# Patient Record
Sex: MALE | Race: WHITE | HISPANIC OR LATINO | Employment: OTHER | URBAN - METROPOLITAN AREA
[De-identification: names, ages, dates, MRNs, and addresses within clinical notes are randomized per-mention and may not be internally consistent; named-entity substitution may affect disease eponyms.]

---

## 2023-07-13 PROBLEM — N19 UREMIA: Status: ACTIVE | Noted: 2023-07-13

## 2023-07-14 PROBLEM — D64.9 ANEMIA: Status: ACTIVE | Noted: 2023-07-14

## 2023-07-14 PROBLEM — Z91.158 NONCOMPLIANCE OF PATIENT WITH RENAL DIALYSIS: Status: ACTIVE | Noted: 2023-07-14

## 2024-07-16 ENCOUNTER — APPOINTMENT (OUTPATIENT)
Dept: CT IMAGING | Facility: HOSPITAL | Age: 69
End: 2024-07-16
Payer: MEDICARE

## 2024-07-16 ENCOUNTER — HOSPITAL ENCOUNTER (OUTPATIENT)
Facility: HOSPITAL | Age: 69
Setting detail: OBSERVATION
Discharge: HOME | End: 2024-07-18
Attending: EMERGENCY MEDICINE | Admitting: INTERNAL MEDICINE
Payer: MEDICARE

## 2024-07-16 DIAGNOSIS — R51.9 NONINTRACTABLE HEADACHE, UNSPECIFIED CHRONICITY PATTERN, UNSPECIFIED HEADACHE TYPE: ICD-10-CM

## 2024-07-16 DIAGNOSIS — E87.5 HYPERKALEMIA: Primary | ICD-10-CM

## 2024-07-16 LAB
ANION GAP SERPL CALCULATED.3IONS-SCNC: 13 MMOL/L (ref 5–15)
BASOPHILS # BLD AUTO: 0.04 10*3/MM3 (ref 0–0.2)
BASOPHILS NFR BLD AUTO: 0.5 % (ref 0–1.5)
BUN SERPL-MCNC: 47 MG/DL (ref 8–23)
BUN/CREAT SERPL: 6.5 (ref 7–25)
CALCIUM SPEC-SCNC: 8.6 MG/DL (ref 8.6–10.5)
CHLORIDE SERPL-SCNC: 95 MMOL/L (ref 98–107)
CO2 SERPL-SCNC: 29 MMOL/L (ref 22–29)
CREAT SERPL-MCNC: 7.18 MG/DL (ref 0.76–1.27)
DEPRECATED RDW RBC AUTO: 41.8 FL (ref 37–54)
EGFRCR SERPLBLD CKD-EPI 2021: 7.8 ML/MIN/1.73
EOSINOPHIL # BLD AUTO: 0.28 10*3/MM3 (ref 0–0.4)
EOSINOPHIL NFR BLD AUTO: 3.4 % (ref 0.3–6.2)
ERYTHROCYTE [DISTWIDTH] IN BLOOD BY AUTOMATED COUNT: 12.1 % (ref 12.3–15.4)
GLUCOSE SERPL-MCNC: 106 MG/DL (ref 65–99)
HCT VFR BLD AUTO: 36.1 % (ref 37.5–51)
HGB BLD-MCNC: 12 G/DL (ref 13–17.7)
IMM GRANULOCYTES # BLD AUTO: 0.01 10*3/MM3 (ref 0–0.05)
IMM GRANULOCYTES NFR BLD AUTO: 0.1 % (ref 0–0.5)
LYMPHOCYTES # BLD AUTO: 1.42 10*3/MM3 (ref 0.7–3.1)
LYMPHOCYTES NFR BLD AUTO: 17.3 % (ref 19.6–45.3)
MAGNESIUM SERPL-MCNC: 2.7 MG/DL (ref 1.6–2.4)
MCH RBC QN AUTO: 31.5 PG (ref 26.6–33)
MCHC RBC AUTO-ENTMCNC: 33.2 G/DL (ref 31.5–35.7)
MCV RBC AUTO: 94.8 FL (ref 79–97)
MONOCYTES # BLD AUTO: 0.85 10*3/MM3 (ref 0.1–0.9)
MONOCYTES NFR BLD AUTO: 10.3 % (ref 5–12)
NEUTROPHILS NFR BLD AUTO: 5.62 10*3/MM3 (ref 1.7–7)
NEUTROPHILS NFR BLD AUTO: 68.4 % (ref 42.7–76)
NRBC BLD AUTO-RTO: 0 /100 WBC (ref 0–0.2)
PHOSPHATE SERPL-MCNC: 4.6 MG/DL (ref 2.5–4.5)
PLATELET # BLD AUTO: 208 10*3/MM3 (ref 140–450)
PMV BLD AUTO: 9.2 FL (ref 6–12)
POTASSIUM SERPL-SCNC: 5.9 MMOL/L (ref 3.5–5.2)
QT INTERVAL: 474 MS
QTC INTERVAL: 482 MS
RBC # BLD AUTO: 3.81 10*6/MM3 (ref 4.14–5.8)
SODIUM SERPL-SCNC: 137 MMOL/L (ref 136–145)
WBC NRBC COR # BLD AUTO: 8.22 10*3/MM3 (ref 3.4–10.8)

## 2024-07-16 PROCEDURE — 25010000002 DIPHENHYDRAMINE PER 50 MG: Performed by: EMERGENCY MEDICINE

## 2024-07-16 PROCEDURE — G0378 HOSPITAL OBSERVATION PER HR: HCPCS

## 2024-07-16 PROCEDURE — 25010000002 PROCHLORPERAZINE 10 MG/2ML SOLUTION: Performed by: EMERGENCY MEDICINE

## 2024-07-16 PROCEDURE — 93010 ELECTROCARDIOGRAM REPORT: CPT | Performed by: INTERNAL MEDICINE

## 2024-07-16 PROCEDURE — 85025 COMPLETE CBC W/AUTO DIFF WBC: CPT | Performed by: EMERGENCY MEDICINE

## 2024-07-16 PROCEDURE — 83735 ASSAY OF MAGNESIUM: CPT | Performed by: INTERNAL MEDICINE

## 2024-07-16 PROCEDURE — 99291 CRITICAL CARE FIRST HOUR: CPT

## 2024-07-16 PROCEDURE — 70450 CT HEAD/BRAIN W/O DYE: CPT

## 2024-07-16 PROCEDURE — 25010000002 HEPARIN (PORCINE) PER 1000 UNITS: Performed by: INTERNAL MEDICINE

## 2024-07-16 PROCEDURE — 96372 THER/PROPH/DIAG INJ SC/IM: CPT

## 2024-07-16 PROCEDURE — 63710000001 INSULIN REGULAR HUMAN PER 5 UNITS: Performed by: EMERGENCY MEDICINE

## 2024-07-16 PROCEDURE — 96374 THER/PROPH/DIAG INJ IV PUSH: CPT

## 2024-07-16 PROCEDURE — 93005 ELECTROCARDIOGRAM TRACING: CPT | Performed by: EMERGENCY MEDICINE

## 2024-07-16 PROCEDURE — 80048 BASIC METABOLIC PNL TOTAL CA: CPT | Performed by: EMERGENCY MEDICINE

## 2024-07-16 PROCEDURE — 99213 OFFICE O/P EST LOW 20 MIN: CPT

## 2024-07-16 PROCEDURE — G0257 UNSCHED DIALYSIS ESRD PT HOS: HCPCS

## 2024-07-16 PROCEDURE — 84100 ASSAY OF PHOSPHORUS: CPT | Performed by: INTERNAL MEDICINE

## 2024-07-16 PROCEDURE — 96375 TX/PRO/DX INJ NEW DRUG ADDON: CPT

## 2024-07-16 RX ORDER — DIPHENHYDRAMINE HYDROCHLORIDE 50 MG/ML
25 INJECTION INTRAMUSCULAR; INTRAVENOUS ONCE
Status: COMPLETED | OUTPATIENT
Start: 2024-07-16 | End: 2024-07-16

## 2024-07-16 RX ORDER — HEPARIN SODIUM 5000 [USP'U]/ML
5000 INJECTION, SOLUTION INTRAVENOUS; SUBCUTANEOUS EVERY 12 HOURS SCHEDULED
Status: DISCONTINUED | OUTPATIENT
Start: 2024-07-16 | End: 2024-07-18 | Stop reason: HOSPADM

## 2024-07-16 RX ORDER — NITROGLYCERIN 0.4 MG/1
0.4 TABLET SUBLINGUAL
Status: DISCONTINUED | OUTPATIENT
Start: 2024-07-16 | End: 2024-07-18 | Stop reason: HOSPADM

## 2024-07-16 RX ORDER — BISACODYL 5 MG/1
5 TABLET, DELAYED RELEASE ORAL DAILY PRN
Status: DISCONTINUED | OUTPATIENT
Start: 2024-07-16 | End: 2024-07-18 | Stop reason: HOSPADM

## 2024-07-16 RX ORDER — ONDANSETRON 2 MG/ML
4 INJECTION INTRAMUSCULAR; INTRAVENOUS EVERY 6 HOURS PRN
Status: DISCONTINUED | OUTPATIENT
Start: 2024-07-16 | End: 2024-07-18 | Stop reason: HOSPADM

## 2024-07-16 RX ORDER — DEXTROSE MONOHYDRATE 25 G/50ML
25 INJECTION, SOLUTION INTRAVENOUS ONCE
Status: COMPLETED | OUTPATIENT
Start: 2024-07-16 | End: 2024-07-16

## 2024-07-16 RX ORDER — SODIUM CHLORIDE 9 MG/ML
40 INJECTION, SOLUTION INTRAVENOUS AS NEEDED
Status: DISCONTINUED | OUTPATIENT
Start: 2024-07-16 | End: 2024-07-18 | Stop reason: HOSPADM

## 2024-07-16 RX ORDER — CALCIUM ACETATE 667 MG/1
2001 CAPSULE ORAL 3 TIMES DAILY
COMMUNITY

## 2024-07-16 RX ORDER — ACETAMINOPHEN 650 MG/1
650 SUPPOSITORY RECTAL EVERY 4 HOURS PRN
Status: DISCONTINUED | OUTPATIENT
Start: 2024-07-16 | End: 2024-07-18 | Stop reason: HOSPADM

## 2024-07-16 RX ORDER — BACLOFEN 10 MG/1
5 TABLET ORAL DAILY PRN
Status: DISCONTINUED | OUTPATIENT
Start: 2024-07-16 | End: 2024-07-18 | Stop reason: HOSPADM

## 2024-07-16 RX ORDER — CALCIUM ACETATE 667 MG/1
1334 CAPSULE ORAL
Status: DISCONTINUED | OUTPATIENT
Start: 2024-07-16 | End: 2024-07-18 | Stop reason: HOSPADM

## 2024-07-16 RX ORDER — PROCHLORPERAZINE EDISYLATE 5 MG/ML
10 INJECTION INTRAMUSCULAR; INTRAVENOUS ONCE
Status: COMPLETED | OUTPATIENT
Start: 2024-07-16 | End: 2024-07-16

## 2024-07-16 RX ORDER — ACETAMINOPHEN 325 MG/1
650 TABLET ORAL EVERY 4 HOURS PRN
Status: DISCONTINUED | OUTPATIENT
Start: 2024-07-16 | End: 2024-07-18 | Stop reason: HOSPADM

## 2024-07-16 RX ORDER — SODIUM CHLORIDE 0.9 % (FLUSH) 0.9 %
10 SYRINGE (ML) INJECTION EVERY 12 HOURS SCHEDULED
Status: DISCONTINUED | OUTPATIENT
Start: 2024-07-16 | End: 2024-07-18 | Stop reason: HOSPADM

## 2024-07-16 RX ORDER — SODIUM CHLORIDE 0.9 % (FLUSH) 0.9 %
10 SYRINGE (ML) INJECTION AS NEEDED
Status: DISCONTINUED | OUTPATIENT
Start: 2024-07-16 | End: 2024-07-18 | Stop reason: HOSPADM

## 2024-07-16 RX ORDER — ONDANSETRON 4 MG/1
4 TABLET, ORALLY DISINTEGRATING ORAL EVERY 6 HOURS PRN
Status: DISCONTINUED | OUTPATIENT
Start: 2024-07-16 | End: 2024-07-18 | Stop reason: HOSPADM

## 2024-07-16 RX ORDER — BISACODYL 10 MG
10 SUPPOSITORY, RECTAL RECTAL DAILY PRN
Status: DISCONTINUED | OUTPATIENT
Start: 2024-07-16 | End: 2024-07-18 | Stop reason: HOSPADM

## 2024-07-16 RX ORDER — HYDRALAZINE HYDROCHLORIDE 50 MG/1
50 TABLET, FILM COATED ORAL 3 TIMES DAILY
Status: DISCONTINUED | OUTPATIENT
Start: 2024-07-16 | End: 2024-07-17

## 2024-07-16 RX ORDER — AMOXICILLIN 250 MG
2 CAPSULE ORAL 2 TIMES DAILY PRN
Status: DISCONTINUED | OUTPATIENT
Start: 2024-07-16 | End: 2024-07-18 | Stop reason: HOSPADM

## 2024-07-16 RX ORDER — AMOXICILLIN 250 MG
2 CAPSULE ORAL DAILY
COMMUNITY

## 2024-07-16 RX ORDER — BACLOFEN 5 MG/1
5 TABLET ORAL DAILY
COMMUNITY

## 2024-07-16 RX ORDER — POLYETHYLENE GLYCOL 3350 17 G/17G
17 POWDER, FOR SOLUTION ORAL DAILY PRN
Status: DISCONTINUED | OUTPATIENT
Start: 2024-07-16 | End: 2024-07-18 | Stop reason: HOSPADM

## 2024-07-16 RX ORDER — ACETAMINOPHEN 160 MG/5ML
650 SOLUTION ORAL EVERY 4 HOURS PRN
Status: DISCONTINUED | OUTPATIENT
Start: 2024-07-16 | End: 2024-07-18 | Stop reason: HOSPADM

## 2024-07-16 RX ADMIN — INSULIN HUMAN 5 UNITS: 100 INJECTION, SOLUTION PARENTERAL at 11:37

## 2024-07-16 RX ADMIN — DIPHENHYDRAMINE HYDROCHLORIDE 25 MG: 50 INJECTION, SOLUTION INTRAMUSCULAR; INTRAVENOUS at 10:27

## 2024-07-16 RX ADMIN — Medication 10 ML: at 21:15

## 2024-07-16 RX ADMIN — HYDRALAZINE HYDROCHLORIDE 50 MG: 50 TABLET ORAL at 18:08

## 2024-07-16 RX ADMIN — CALCIUM ACETATE 1334 MG: 667 CAPSULE ORAL at 18:02

## 2024-07-16 RX ADMIN — DEXTROSE MONOHYDRATE 25 G: 25 INJECTION, SOLUTION INTRAVENOUS at 11:37

## 2024-07-16 RX ADMIN — PROCHLORPERAZINE EDISYLATE 10 MG: 5 INJECTION INTRAMUSCULAR; INTRAVENOUS at 10:27

## 2024-07-16 RX ADMIN — HEPARIN SODIUM 5000 UNITS: 5000 INJECTION INTRAVENOUS; SUBCUTANEOUS at 21:15

## 2024-07-16 NOTE — ED NOTES
Nursing report ED to floor  Adryan Olivarez  68 y.o.  male    HPI :  HPI (Adult)  Stated Reason for Visit: headache    Chief Complaint  Chief Complaint   Patient presents with    Headache       Admitting doctor:   Nicolás Sheldon MD    Admitting diagnosis:   The primary encounter diagnosis was Hyperkalemia. A diagnosis of Nonintractable headache, unspecified chronicity pattern, unspecified headache type was also pertinent to this visit.    Code status:   Current Code Status       Date Active Code Status Order ID Comments User Context       Prior            Allergies:   Patient has no known allergies.    Isolation:   No active isolations    Intake and Output  No intake or output data in the 24 hours ending 07/16/24 1236    Weight:       07/16/24  0937   Weight: 101 kg (222 lb)       Most recent vitals:   Vitals:    07/16/24 1137 07/16/24 1138 07/16/24 1139 07/16/24 1140   BP:       Pulse: 56 64 67 62   Resp:       Temp:       SpO2: 98% 98% 99% 98%   Weight:       Height:           Active LDAs/IV Access:   Lines, Drains & Airways       Active LDAs       Name Placement date Placement time Site Days    Peripheral IV 07/16/24 1025 Right Antecubital 07/16/24  1025  Antecubital  less than 1                    Labs (abnormal labs have a star):   Labs Reviewed   BASIC METABOLIC PANEL - Abnormal; Notable for the following components:       Result Value    Glucose 106 (*)     BUN 47 (*)     Creatinine 7.18 (*)     Potassium 5.9 (*)     Chloride 95 (*)     BUN/Creatinine Ratio 6.5 (*)     eGFR 7.8 (*)     All other components within normal limits    Narrative:     GFR Normal >60  Chronic Kidney Disease <60  Kidney Failure <15     CBC WITH AUTO DIFFERENTIAL - Abnormal; Notable for the following components:    RBC 3.81 (*)     Hemoglobin 12.0 (*)     Hematocrit 36.1 (*)     RDW 12.1 (*)     Lymphocyte % 17.3 (*)     All other components within normal limits   MAGNESIUM - Abnormal; Notable for the following components:    Magnesium  2.7 (*)     All other components within normal limits   PHOSPHORUS - Abnormal; Notable for the following components:    Phosphorus 4.6 (*)     All other components within normal limits   BASIC METABOLIC PANEL   POCT GLUCOSE FINGERSTICK   POCT GLUCOSE FINGERSTICK   POCT GLUCOSE FINGERSTICK   POCT GLUCOSE FINGERSTICK   CBC AND DIFFERENTIAL    Narrative:     The following orders were created for panel order CBC & Differential.  Procedure                               Abnormality         Status                     ---------                               -----------         ------                     CBC Auto Differential[[id number]]        Abnormal            Final result                 Please view results for these tests on the individual orders.       EKG:   ECG 12 Lead Electrolyte Imbalance   Final Result   HEART RATE=62  bpm   RR Jytquxsl=925  ms   ME Kqysuvwd=140  ms   P Horizontal Axis=42  deg   P Front Axis=22  deg   QRSD Htzgjayl=012  ms   QT Txkuyeqr=989  ms   OKgC=347  ms   QRS Axis=-62  deg   T Wave Axis=28  deg   - ABNORMAL ECG -   Sinus rhythm   Left anterior fascicular block   Abnormal R-wave progression, late transition   Minimal ST elevation, lateral leads   Borderline  prolonged QT interval   No change from previous tracing   Electronically Signed By: Reggie Estrada) ([facility]) 2024-07-16 12:30:33   Date and Time of Study:2024-07-16 11:16:42          Meds given in ED:   Medications   sodium chloride 0.9 % flush 10 mL (has no administration in time range)   prochlorperazine (COMPAZINE) injection 10 mg (10 mg Intravenous Given 7/16/24 1027)   diphenhydrAMINE (BENADRYL) injection 25 mg (25 mg Intravenous Given 7/16/24 1027)   insulin regular (humuLIN R,novoLIN R) injection 5 Units (5 Units Intravenous Given 7/16/24 1137)   dextrose (D50W) (25 g/50 mL) IV injection 25 g (25 g Intravenous Given 7/16/24 1137)       Imaging results:  CT Head Without Contrast    Result Date: 7/16/2024  There are calcified  atherosclerotic plaques in the intracranial segments of the distal vertebral arteries and cavernous segments of the internal carotid arteries bilaterally. Otherwise this is a normal head CT. The etiology of this patient's headaches is not established on this exam.  Radiation dose reduction techniques were utilized, including automated exposure control and exposure modulation based on body size.        Ambulatory status:   - up ad shameka      Social issues:   Social History     Socioeconomic History    Marital status:    Tobacco Use    Smoking status: Never    Smokeless tobacco: Never   Vaping Use    Vaping status: Never Used   Substance and Sexual Activity    Alcohol use: No    Drug use: No    Sexual activity: Defer       Peripheral Neurovascular  Peripheral Neurovascular (Adult)  Peripheral Neurovascular WDL: WDL    Neuro Cognitive  Neuro Cognitive (Adult)  Cognitive/Neuro/Behavioral WDL: .WDL except  Headache Assessment  Headache Location: generalized  Severity Rating (0-10): 8  Description/Character: pressure  Associated Signs/Symptoms: fatigue, weakness  Pupils  Pupil PERRLA: yes  Lino Coma Scale  Best Eye Response: 4-->(E4) spontaneous  Best Motor Response: 6-->(M6) obeys commands  Best Verbal Response: 5-->(V5) oriented  [place] Coma Scale Score: 15  NIH Stroke Scale  Interval: baseline  1a. Level of Consciousness: 0-->Alert, keenly responsive  1b. LOC Questions: 0-->Answers both questions correctly  1c. LOC Commands: 0-->Performs both tasks correctly  2. Best Gaze: 0-->Normal  3. Visual: 0-->No visual loss  4. Facial Palsy: 0-->Normal symmetrical movements  5a. Motor Arm, Left: 0-->No drift, limb holds 90 (or 45) degrees for full 10 secs  5b. Motor Arm, Right: 0-->No drift, limb holds 90 (or 45) degrees for full 10 secs  6a. Motor Leg, Left: 0-->No drift, leg holds 30 degree position for full 5 secs  6b. Motor Leg, Right: 0-->No drift, leg holds 30 degree position for full 5 secs  7. Limb Ataxia:  0-->Absent  8. Sensory: 0-->Normal, no sensory loss  9. Best Language: 0-->No aphasia, normal  10. Dysarthria: 0-->Normal  11. Extinction and Inattention (formerly Neglect): 0-->No abnormality  Total (NIH Stroke Scale): 0    Learning  Learning Assessment (Adult)  Learning Readiness and Ability: language barrier identified    Respiratory  Respiratory WDL  Respiratory WDL: WDL    Abdominal Pain       Pain Assessments  Pain (Adult)  (0-10) Pain Rating: Rest: 0  Response to Pain Interventions: nonverbal indicators absent/decreased    NIH Stroke Scale  NIH Stroke Scale  Interval: baseline  1a. Level of Consciousness: 0-->Alert, keenly responsive  1b. LOC Questions: 0-->Answers both questions correctly  1c. LOC Commands: 0-->Performs both tasks correctly  2. Best Gaze: 0-->Normal  3. Visual: 0-->No visual loss  4. Facial Palsy: 0-->Normal symmetrical movements  5a. Motor Arm, Left: 0-->No drift, limb holds 90 (or 45) degrees for full 10 secs  5b. Motor Arm, Right: 0-->No drift, limb holds 90 (or 45) degrees for full 10 secs  6a. Motor Leg, Left: 0-->No drift, leg holds 30 degree position for full 5 secs  6b. Motor Leg, Right: 0-->No drift, leg holds 30 degree position for full 5 secs  7. Limb Ataxia: 0-->Absent  8. Sensory: 0-->Normal, no sensory loss  9. Best Language: 0-->No aphasia, normal  10. Dysarthria: 0-->Normal  11. Extinction and Inattention (formerly Neglect): 0-->No abnormality  Total (NIH Stroke Scale): 0    Anson Bergeron RN  07/16/24 12:36 EDT

## 2024-07-16 NOTE — ED NOTES
Patient to ER via car from home for headache x 3 days    Patient had dialysis yesterday as normal schedule

## 2024-07-16 NOTE — PLAN OF CARE
Problem: Adult Inpatient Plan of Care  Goal: Plan of Care Review  Outcome: Ongoing, Not Progressing  Goal: Patient-Specific Goal (Individualized)  Outcome: Ongoing, Not Progressing  Flowsheets (Taken 7/16/2024 1851)  Individualized Care Needs: needs interpretor for medical communication but understands some english  Goal: Absence of Hospital-Acquired Illness or Injury  Outcome: Ongoing, Not Progressing  Goal: Optimal Comfort and Wellbeing  Outcome: Ongoing, Not Progressing  Intervention: Provide Person-Centered Care  Recent Flowsheet Documentation  Taken 7/16/2024 1705 by Bere Johns, RN  Trust Relationship/Rapport:   care explained   choices provided   reassurance provided   thoughts/feelings acknowledged  Goal: Readiness for Transition of Care  Outcome: Ongoing, Not Progressing  Intervention: Mutually Develop Transition Plan  Recent Flowsheet Documentation  Taken 7/16/2024 1730 by Bere Johns, RN  Transportation Anticipated: family or friend will provide  Patient/Family Anticipated Services at Transition: (hemodiaysis) other (see comments)  Patient/Family Anticipates Transition to: home  Taken 7/16/2024 1728 by Bere Johns, RN  Equipment Currently Used at Home: none   Goal Outcome Evaluation:

## 2024-07-16 NOTE — H&P
Patient Name:  Adryan Olivarez  YOB: 1955  MRN:  [id number]  Admit Date:  7/16/2024  Patient Care Team:  Freda Hall APRN as PCP - General (Family Medicine)      Subjective   History Present Illness     Chief Complaint   Patient presents with    Headache       Mr. Olivarez is a 68 y.o. with a history of hypertension ESRD who presents to [facility] complaining of 3 days of acute headache that was intermittent.  He reported that it was stabbing and would come and go over the course of several hours.  He did not notice any fever or vision change.  No nausea or vomiting reported.  I saw him in dialysis and he is not having headache currently.  He was found to be hyperkalemic in the emergency room and nephrology was consulted.  He is now getting acute dialysis.      Review of Systems   Constitutional:  Negative for diaphoresis and fever.   HENT:  Negative for sore throat and trouble swallowing.    Respiratory:  Negative for cough and shortness of breath.    Cardiovascular:  Negative for chest pain and palpitations.   Gastrointestinal:  Negative for diarrhea, nausea and vomiting.   Endocrine: Negative for cold intolerance and heat intolerance.   Genitourinary:  Negative for dysuria and flank pain.   Musculoskeletal:  Negative for neck pain and neck stiffness.   Skin:  Negative for pallor and rash.   Allergic/Immunologic: Negative for environmental allergies and food allergies.   Neurological:  Negative for seizures and syncope.   Hematological:  Negative for adenopathy. Does not bruise/bleed easily.   Psychiatric/Behavioral:  Negative for agitation and confusion.         Personal History     Past Medical History:   Diagnosis Date    Anxiety     Chronic kidney disease     Stage 3    Hypertension     Stroke 1989     Past Surgical History:   Procedure Laterality Date    INSERTION HEMODIALYSIS CATHETER Right 2/27/2021    Procedure: TUNNELED DIAYLIS CATHETER PLACEMENT;  Surgeon: Rose Mary  Fernando HENDERSON MD;  Location: [facility] OR;  Service: Vascular;  Laterality: Right;     No family history on file.  Social History     Tobacco Use    Smoking status: Never    Smokeless tobacco: Never   Vaping Use    Vaping status: Never Used   Substance Use Topics    Alcohol use: No    Drug use: No     No current facility-administered medications on file prior to encounter.     Current Outpatient Medications on File Prior to Encounter   Medication Sig Dispense Refill    Baclofen (LIORESAL) 5 MG tablet Take 1 tablet by mouth Daily.      calcium acetate (PHOS BINDER,) 667 MG capsule capsule Take 3 capsules by mouth 3 (Three) Times a Day.      hydrALAZINE (APRESOLINE) 50 MG tablet Take 1 tablet by mouth 3 (Three) Times a Day. 90 tablet 1    sennosides-docusate (Senna Plus) 8.6-50 MG per tablet Take 2 tablets by mouth Daily.      [DISCONTINUED] amLODIPine (NORVASC) 10 MG tablet Take 1 tablet by mouth Daily.      [DISCONTINUED] atorvastatin (LIPITOR) 20 MG tablet Take 1 tablet by mouth Daily.      [DISCONTINUED] Ergocalciferol (VITAMIN D2 PO) Take 1.25 mg by mouth.      [DISCONTINUED] linaclotide (LINZESS) 290 MCG capsule capsule Take 1 capsule by mouth Every Morning Before Breakfast.      [DISCONTINUED] Loratadine 10 MG capsule Take 1 capsule by mouth 1 (One) Time.      [DISCONTINUED] losartan (COZAAR) 50 MG tablet Take 1 tablet by mouth Daily. 30 tablet 1    [DISCONTINUED] PARoxetine (PAXIL) 40 MG tablet Take 1 tablet by mouth Every Morning.      [DISCONTINUED] sodium bicarbonate 650 MG tablet Take 1 tablet by mouth 4 (Four) Times a Day. 120 tablet 0    [DISCONTINUED] tamsulosin (FLOMAX) 0.4 MG capsule 24 hr capsule Take 1 capsule by mouth Every Night.       No Known Allergies    Objective    Objective     Vital Signs  Temp:  [97 °F (36.1 °C)] 97 °F (36.1 °C)  Heart Rate:  [56-82] 62  Resp:  [16] 16  BP: (152-173)/(74-83) 173/83  SpO2:  [94 %-100 %] 98 %  on   ;      Body mass index is 31.85 kg/m².    Physical  Exam  Vitals and nursing note reviewed.   Constitutional:       General: He is not in acute distress.     Appearance: He is not diaphoretic.   HENT:      Head: Atraumatic.   Eyes:      Conjunctiva/sclera: Conjunctivae normal.      Pupils: Pupils are equal, round, and reactive to light.   Cardiovascular:      Rate and Rhythm: Normal rate and regular rhythm.      Pulses: Normal pulses.   Pulmonary:      Effort: Pulmonary effort is normal.      Breath sounds: No wheezing.   Abdominal:      General: There is no distension.      Palpations: Abdomen is soft.      Tenderness: There is no abdominal tenderness. There is no guarding or rebound.   Musculoskeletal:         General: No tenderness.   Skin:     General: Skin is warm and dry.   Neurological:      Mental Status: He is alert. Mental status is at baseline.   Psychiatric:         Mood and Affect: Mood normal.         Behavior: Behavior normal.         Results Review:  I reviewed the patient's new clinical results.  I reviewed the patient's new imaging results and agree with the interpretation.  I personally viewed and interpreted the patient's EKG/Telemetry data  I reviewed prior records.    Lab Results (last 24 hours)       Procedure Component Value Units Date/Time    CBC & Differential [[id number]]  (Abnormal) Collected: 07/16/24 1019    Specimen: Blood Updated: 07/16/24 1028    Narrative:      The following orders were created for panel order CBC & Differential.  Procedure                               Abnormality         Status                     ---------                               -----------         ------                     CBC Auto Differential[[id number]]        Abnormal            Final result                 Please view results for these tests on the individual orders.    Basic Metabolic Panel [[id number]]  (Abnormal) Collected: 07/16/24 1019    Specimen: Blood Updated: 07/16/24 1043     Glucose 106 mg/dL      BUN 47 mg/dL      Creatinine 7.18 mg/dL       Sodium 137 mmol/L      Potassium 5.9 mmol/L      Chloride 95 mmol/L      CO2 29.0 mmol/L      Calcium 8.6 mg/dL      BUN/Creatinine Ratio 6.5     Anion Gap 13.0 mmol/L      eGFR 7.8 mL/min/1.73      Comment: <15 Indicative of kidney failure       Narrative:      GFR Normal >60  Chronic Kidney Disease <60  Kidney Failure <15      CBC Auto Differential [[id number]]  (Abnormal) Collected: 07/16/24 1019    Specimen: Blood Updated: 07/16/24 1028     WBC 8.22 10*3/mm3      RBC 3.81 10*6/mm3      Hemoglobin 12.0 g/dL      Hematocrit 36.1 %      MCV 94.8 fL      MCH 31.5 pg      MCHC 33.2 g/dL      RDW 12.1 %      RDW-SD 41.8 fl      MPV 9.2 fL      Platelets 208 10*3/mm3      Neutrophil % 68.4 %      Lymphocyte % 17.3 %      Monocyte % 10.3 %      Eosinophil % 3.4 %      Basophil % 0.5 %      Immature Grans % 0.1 %      Neutrophils, Absolute 5.62 10*3/mm3      Lymphocytes, Absolute 1.42 10*3/mm3      Monocytes, Absolute 0.85 10*3/mm3      Eosinophils, Absolute 0.28 10*3/mm3      Basophils, Absolute 0.04 10*3/mm3      Immature Grans, Absolute 0.01 10*3/mm3      nRBC 0.0 /100 WBC     Magnesium [[id number]]  (Abnormal) Collected: 07/16/24 1019    Specimen: Blood Updated: 07/16/24 1233     Magnesium 2.7 mg/dL     Phosphorus [[id number]]  (Abnormal) Collected: 07/16/24 1019    Specimen: Blood Updated: 07/16/24 1233     Phosphorus 4.6 mg/dL             Imaging Results (Last 24 Hours)       Procedure Component Value Units Date/Time    CT Head Without Contrast [[id number]] Collected: 07/16/24 1135     Updated: 07/16/24 1302    Narrative:      EMERGENCY CT SCAN OF THE HEAD WITHOUT CONTRAST ON 07/16/2024     CLINICAL HISTORY: This is a 68-year-old male patient who has had a  headache for 2 months.     TECHNIQUE: Spiral CT images were obtained from the base of the skull to  the vertex without intravenous contrast. The images were reformatted and  are submitted in 3 mm thick axial, sagittal and coronal CT sections with  brain algorithm.      There are no prior head CTs or MRIs of the brain from [facility] for comparison.     FINDINGS: The brain parenchyma is normal in attenuation. The ventricles  are normal in size. I see no focal mass effect. There is no midline  shift. No extra-axial fluid collections are identified. There is no  evidence of acute intracranial hemorrhage. The calvarium and the skull  base are normal in appearance. The paranasal sinuses and the mastoid air  cells and the middle ear cavities are clear. The orbits are normal in  appearance. There is some calcified atherosclerotic plaques in the  intracranial segments of the distal vertebral arteries and the cavernous  segments of the internal carotid arteries bilaterally.       Impression:         1. There are calcified atherosclerotic plaques in the intracranial  segments of the distal vertebral arteries and cavernous segments of the  internal carotid arteries bilaterally. Otherwise, this is a normal head  CT. The etiology of this patient's headaches is not established on this  exam.     Radiation dose reduction techniques were utilized, including automated  exposure control and exposure modulation based on body size.        This report was finalized on 7/16/2024 12:59 PM by Dr. Maximo Gonzalez M.D  on Workstation: [id number]               Results for orders placed during the hospital encounter of 02/23/21    Adult Transthoracic Echo Complete W/ Cont if Necessary Per Protocol    Interpretation Summary  · Estimated left ventricular EF = 63% Left ventricular systolic function is normal.  · Left ventricular diastolic function was normal.  · Mild tricuspid valve regurgitation is present. Estimated right ventricular systolic pressure from tricuspid regurgitation is mildly elevated (35-45 mmHg). Calculated right ventricular systolic pressure from tricuspid regurgitation is 40.5 mmHg.      ECG 12 Lead Electrolyte Imbalance   Final Result   HEART RATE=62  bpm   RR Xbywrkar=695   ms   ND Wtubcnrq=448  ms   P Horizontal Axis=42  deg   P Front Axis=22  deg   QRSD Lelnteaq=838  ms   QT Lbesruem=347  ms   FXaY=480  ms   QRS Axis=-62  deg   T Wave Axis=28  deg   - ABNORMAL ECG -   Sinus rhythm   Left anterior fascicular block   Abnormal R-wave progression, late transition   Minimal ST elevation, lateral leads   Borderline  prolonged QT interval   No change from previous tracing   Electronically Signed By: Reggie Estrada ([facility]) ([facility]) 2024-07-16 12:30:33   Date and Time of Study:2024-07-16 11:16:42           Assessment/Plan     Active Hospital Problems    Diagnosis  POA    Hyperkalemia [E87.5]  Yes      Resolved Hospital Problems   No resolved problems to display.       Mr. Olivarez is a 68 y.o.     ESRD: Hyperkalemia.  Undergoing dialysis now.  Consult nephrology.  Headache: No acute changes were seen on CT.  Since he has had persistent headache for multiple days we will ask neurology to review.  Hypertension: Resume home regimen and monitor  PPx: Heparin subcutaneous  I discussed the patient's findings and my recommendations with patient and ED provider.      Nicolás Sheldon MD  [facility]  07/16/24  15:12 EDT    Dictated portions of note using dragon dictation software.

## 2024-07-16 NOTE — NURSING NOTE
Dialysis complete, removed 2 liters with this treatment and no complications noted.  Pre and post vitals are in epic.

## 2024-07-16 NOTE — ED NOTES
Nursing report ED to floor  Adryan Olivarez  68 y.o.  male    HPI :  HPI (Adult)  Stated Reason for Visit: headache    Chief Complaint  Chief Complaint   Patient presents with    Headache       Admitting doctor:   Nicolás Sheldon MD    Admitting diagnosis:   The primary encounter diagnosis was Hyperkalemia. A diagnosis of Nonintractable headache, unspecified chronicity pattern, unspecified headache type was also pertinent to this visit.    Code status:   Current Code Status       Date Active Code Status Order ID Comments User Context       Prior            Allergies:   Patient has no known allergies.    Isolation:   No active isolations    Intake and Output  No intake or output data in the 24 hours ending 07/16/24 1232    Weight:       07/16/24  0937   Weight: 101 kg (222 lb)       Most recent vitals:   Vitals:    07/16/24 1137 07/16/24 1138 07/16/24 1139 07/16/24 1140   BP:       Pulse: 56 64 67 62   Resp:       Temp:       SpO2: 98% 98% 99% 98%   Weight:       Height:           Active LDAs/IV Access:   Lines, Drains & Airways       Active LDAs       Name Placement date Placement time Site Days    Peripheral IV 07/16/24 1025 Right Antecubital 07/16/24  1025  Antecubital  less than 1                    Labs (abnormal labs have a star):   Labs Reviewed   BASIC METABOLIC PANEL - Abnormal; Notable for the following components:       Result Value    Glucose 106 (*)     BUN 47 (*)     Creatinine 7.18 (*)     Potassium 5.9 (*)     Chloride 95 (*)     BUN/Creatinine Ratio 6.5 (*)     eGFR 7.8 (*)     All other components within normal limits    Narrative:     GFR Normal >60  Chronic Kidney Disease <60  Kidney Failure <15     CBC WITH AUTO DIFFERENTIAL - Abnormal; Notable for the following components:    RBC 3.81 (*)     Hemoglobin 12.0 (*)     Hematocrit 36.1 (*)     RDW 12.1 (*)     Lymphocyte % 17.3 (*)     All other components within normal limits   BASIC METABOLIC PANEL   MAGNESIUM   PHOSPHORUS   POCT GLUCOSE  FINGERSTICK   POCT GLUCOSE FINGERSTICK   POCT GLUCOSE FINGERSTICK   POCT GLUCOSE FINGERSTICK   CBC AND DIFFERENTIAL    Narrative:     The following orders were created for panel order CBC & Differential.  Procedure                               Abnormality         Status                     ---------                               -----------         ------                     CBC Auto Differential[[id number]]        Abnormal            Final result                 Please view results for these tests on the individual orders.       EKG:   ECG 12 Lead Electrolyte Imbalance   Final Result   HEART RATE=62  bpm   RR Fthnzlzo=313  ms   IL Yikdybim=767  ms   P Horizontal Axis=42  deg   P Front Axis=22  deg   QRSD Nnqroobd=893  ms   QT Nmfsinlv=904  ms   GWeL=485  ms   QRS Axis=-62  deg   T Wave Axis=28  deg   - ABNORMAL ECG -   Sinus rhythm   Left anterior fascicular block   Abnormal R-wave progression, late transition   Minimal ST elevation, lateral leads   Borderline  prolonged QT interval   No change from previous tracing   Electronically Signed By: Reggie EstradaMAYLIN) ([facility]) 2024-07-16 12:30:33   Date and Time of Study:2024-07-16 11:16:42          Meds given in ED:   Medications   sodium chloride 0.9 % flush 10 mL (has no administration in time range)   prochlorperazine (COMPAZINE) injection 10 mg (10 mg Intravenous Given 7/16/24 1027)   diphenhydrAMINE (BENADRYL) injection 25 mg (25 mg Intravenous Given 7/16/24 1027)   insulin regular (humuLIN R,novoLIN R) injection 5 Units (5 Units Intravenous Given 7/16/24 1137)   dextrose (D50W) (25 g/50 mL) IV injection 25 g (25 g Intravenous Given 7/16/24 1137)       Imaging results:  CT Head Without Contrast    Result Date: 7/16/2024  There are calcified atherosclerotic plaques in the intracranial segments of the distal vertebral arteries and cavernous segments of the internal carotid arteries bilaterally. Otherwise this is a normal head CT. The etiology of this patient's  headaches is not established on this exam.  Radiation dose reduction techniques were utilized, including automated exposure control and exposure modulation based on body size.        Ambulatory status:   - assist    Social issues:   Social History     Socioeconomic History    Marital status:    Tobacco Use    Smoking status: Never    Smokeless tobacco: Never   Vaping Use    Vaping status: Never Used   Substance and Sexual Activity    Alcohol use: No    Drug use: No    Sexual activity: Defer       Peripheral Neurovascular  Peripheral Neurovascular (Adult)  Peripheral Neurovascular WDL: WDL    Neuro Cognitive  Neuro Cognitive (Adult)  Cognitive/Neuro/Behavioral WDL: .WDL except  Headache Assessment  Headache Location: generalized  Severity Rating (0-10): 8  Description/Character: pressure  Associated Signs/Symptoms: fatigue, weakness  Pupils  Pupil PERRLA: yes  Bronson Coma Scale  Best Eye Response: 4-->(E4) spontaneous  Best Motor Response: 6-->(M6) obeys commands  Best Verbal Response: 5-->(V5) oriented  Bronson Coma Scale Score: 15  NIH Stroke Scale  Interval: baseline  1a. Level of Consciousness: 0-->Alert, keenly responsive  1b. LOC Questions: 0-->Answers both questions correctly  1c. LOC Commands: 0-->Performs both tasks correctly  2. Best Gaze: 0-->Normal  3. Visual: 0-->No visual loss  4. Facial Palsy: 0-->Normal symmetrical movements  5a. Motor Arm, Left: 0-->No drift, limb holds 90 (or 45) degrees for full 10 secs  5b. Motor Arm, Right: 0-->No drift, limb holds 90 (or 45) degrees for full 10 secs  6a. Motor Leg, Left: 0-->No drift, leg holds 30 degree position for full 5 secs  6b. Motor Leg, Right: 0-->No drift, leg holds 30 degree position for full 5 secs  7. Limb Ataxia: 0-->Absent  8. Sensory: 0-->Normal, no sensory loss  9. Best Language: 0-->No aphasia, normal  10. Dysarthria: 0-->Normal  11. Extinction and Inattention (formerly Neglect): 0-->No abnormality  Total (NIH Stroke Scale):  0    Learning  Learning Assessment (Adult)  Learning Readiness and Ability: language barrier identified    Respiratory  Respiratory WDL  Respiratory WDL: WDL    Abdominal Pain       Pain Assessments  Pain (Adult)  (0-10) Pain Rating: Rest: 0  Response to Pain Interventions: nonverbal indicators absent/decreased    NIH Stroke Scale  NIH Stroke Scale  Interval: baseline  1a. Level of Consciousness: 0-->Alert, keenly responsive  1b. LOC Questions: 0-->Answers both questions correctly  1c. LOC Commands: 0-->Performs both tasks correctly  2. Best Gaze: 0-->Normal  3. Visual: 0-->No visual loss  4. Facial Palsy: 0-->Normal symmetrical movements  5a. Motor Arm, Left: 0-->No drift, limb holds 90 (or 45) degrees for full 10 secs  5b. Motor Arm, Right: 0-->No drift, limb holds 90 (or 45) degrees for full 10 secs  6a. Motor Leg, Left: 0-->No drift, leg holds 30 degree position for full 5 secs  6b. Motor Leg, Right: 0-->No drift, leg holds 30 degree position for full 5 secs  7. Limb Ataxia: 0-->Absent  8. Sensory: 0-->Normal, no sensory loss  9. Best Language: 0-->No aphasia, normal  10. Dysarthria: 0-->Normal  11. Extinction and Inattention (formerly Neglect): 0-->No abnormality  Total (NIH Stroke Scale): 0    Elin Sun RN  07/16/24 12:32 EDT

## 2024-07-16 NOTE — ED NOTES
Nursing report ED to floor  Adryan Olivarez  68 y.o.  male    HPI :  HPI (Adult)  Stated Reason for Visit: headache    Chief Complaint  Chief Complaint   Patient presents with    Headache       Admitting doctor:   Nicolás Sheldon MD    Admitting diagnosis:   The primary encounter diagnosis was Hyperkalemia. A diagnosis of Nonintractable headache, unspecified chronicity pattern, unspecified headache type was also pertinent to this visit.    Code status:   Current Code Status       Date Active Code Status Order ID Comments User Context       Prior            Allergies:   Patient has no known allergies.    Isolation:   No active isolations    Intake and Output  No intake or output data in the 24 hours ending 07/16/24 1232    Weight:       07/16/24  0937   Weight: 101 kg (222 lb)       Most recent vitals:   Vitals:    07/16/24 1137 07/16/24 1138 07/16/24 1139 07/16/24 1140   BP:       Pulse: 56 64 67 62   Resp:       Temp:       SpO2: 98% 98% 99% 98%   Weight:       Height:           Active LDAs/IV Access:   Lines, Drains & Airways       Active LDAs       Name Placement date Placement time Site Days    Peripheral IV 07/16/24 1025 Right Antecubital 07/16/24  1025  Antecubital  less than 1                    Labs (abnormal labs have a star):   Labs Reviewed   BASIC METABOLIC PANEL - Abnormal; Notable for the following components:       Result Value    Glucose 106 (*)     BUN 47 (*)     Creatinine 7.18 (*)     Potassium 5.9 (*)     Chloride 95 (*)     BUN/Creatinine Ratio 6.5 (*)     eGFR 7.8 (*)     All other components within normal limits    Narrative:     GFR Normal >60  Chronic Kidney Disease <60  Kidney Failure <15     CBC WITH AUTO DIFFERENTIAL - Abnormal; Notable for the following components:    RBC 3.81 (*)     Hemoglobin 12.0 (*)     Hematocrit 36.1 (*)     RDW 12.1 (*)     Lymphocyte % 17.3 (*)     All other components within normal limits   BASIC METABOLIC PANEL   MAGNESIUM   PHOSPHORUS   POCT GLUCOSE  FINGERSTICK   POCT GLUCOSE FINGERSTICK   POCT GLUCOSE FINGERSTICK   POCT GLUCOSE FINGERSTICK   CBC AND DIFFERENTIAL    Narrative:     The following orders were created for panel order CBC & Differential.  Procedure                               Abnormality         Status                     ---------                               -----------         ------                     CBC Auto Differential[[id number]]        Abnormal            Final result                 Please view results for these tests on the individual orders.       EKG:   ECG 12 Lead Electrolyte Imbalance   Final Result   HEART RATE=62  bpm   RR Cnmqgjpo=390  ms   AZ Ngaebtlp=429  ms   P Horizontal Axis=42  deg   P Front Axis=22  deg   QRSD Cbnsmwio=261  ms   QT Aeceoiwd=521  ms   OMmR=634  ms   QRS Axis=-62  deg   T Wave Axis=28  deg   - ABNORMAL ECG -   Sinus rhythm   Left anterior fascicular block   Abnormal R-wave progression, late transition   Minimal ST elevation, lateral leads   Borderline  prolonged QT interval   No change from previous tracing   Electronically Signed By: Reggie EstradaMAYLIN) ([facility]) 2024-07-16 12:30:33   Date and Time of Study:2024-07-16 11:16:42          Meds given in ED:   Medications   sodium chloride 0.9 % flush 10 mL (has no administration in time range)   prochlorperazine (COMPAZINE) injection 10 mg (10 mg Intravenous Given 7/16/24 1027)   diphenhydrAMINE (BENADRYL) injection 25 mg (25 mg Intravenous Given 7/16/24 1027)   insulin regular (humuLIN R,novoLIN R) injection 5 Units (5 Units Intravenous Given 7/16/24 1137)   dextrose (D50W) (25 g/50 mL) IV injection 25 g (25 g Intravenous Given 7/16/24 1137)       Imaging results:  CT Head Without Contrast    Result Date: 7/16/2024  There are calcified atherosclerotic plaques in the intracranial segments of the distal vertebral arteries and cavernous segments of the internal carotid arteries bilaterally. Otherwise this is a normal head CT. The etiology of this patient's  headaches is not established on this exam.  Radiation dose reduction techniques were utilized, including automated exposure control and exposure modulation based on body size.        Ambulatory status:   - assist    Social issues:   Social History     Socioeconomic History    Marital status:    Tobacco Use    Smoking status: Never    Smokeless tobacco: Never   Vaping Use    Vaping status: Never Used   Substance and Sexual Activity    Alcohol use: No    Drug use: No    Sexual activity: Defer       Peripheral Neurovascular  Peripheral Neurovascular (Adult)  Peripheral Neurovascular WDL: WDL    Neuro Cognitive  Neuro Cognitive (Adult)  Cognitive/Neuro/Behavioral WDL: .WDL except  Headache Assessment  Headache Location: generalized  Severity Rating (0-10): 8  Description/Character: pressure  Associated Signs/Symptoms: fatigue, weakness  Pupils  Pupil PERRLA: yes  Rufe Coma Scale  Best Eye Response: 4-->(E4) spontaneous  Best Motor Response: 6-->(M6) obeys commands  Best Verbal Response: 5-->(V5) oriented  Rufe Coma Scale Score: 15  NIH Stroke Scale  Interval: baseline  1a. Level of Consciousness: 0-->Alert, keenly responsive  1b. LOC Questions: 0-->Answers both questions correctly  1c. LOC Commands: 0-->Performs both tasks correctly  2. Best Gaze: 0-->Normal  3. Visual: 0-->No visual loss  4. Facial Palsy: 0-->Normal symmetrical movements  5a. Motor Arm, Left: 0-->No drift, limb holds 90 (or 45) degrees for full 10 secs  5b. Motor Arm, Right: 0-->No drift, limb holds 90 (or 45) degrees for full 10 secs  6a. Motor Leg, Left: 0-->No drift, leg holds 30 degree position for full 5 secs  6b. Motor Leg, Right: 0-->No drift, leg holds 30 degree position for full 5 secs  7. Limb Ataxia: 0-->Absent  8. Sensory: 0-->Normal, no sensory loss  9. Best Language: 0-->No aphasia, normal  10. Dysarthria: 0-->Normal  11. Extinction and Inattention (formerly Neglect): 0-->No abnormality  Total (NIH Stroke Scale):  0    Learning  Learning Assessment (Adult)  Learning Readiness and Ability: language barrier identified    Respiratory  Respiratory WDL  Respiratory WDL: WDL    Abdominal Pain       Pain Assessments  Pain (Adult)  (0-10) Pain Rating: Rest: 0  Response to Pain Interventions: nonverbal indicators absent/decreased    NIH Stroke Scale  NIH Stroke Scale  Interval: baseline  1a. Level of Consciousness: 0-->Alert, keenly responsive  1b. LOC Questions: 0-->Answers both questions correctly  1c. LOC Commands: 0-->Performs both tasks correctly  2. Best Gaze: 0-->Normal  3. Visual: 0-->No visual loss  4. Facial Palsy: 0-->Normal symmetrical movements  5a. Motor Arm, Left: 0-->No drift, limb holds 90 (or 45) degrees for full 10 secs  5b. Motor Arm, Right: 0-->No drift, limb holds 90 (or 45) degrees for full 10 secs  6a. Motor Leg, Left: 0-->No drift, leg holds 30 degree position for full 5 secs  6b. Motor Leg, Right: 0-->No drift, leg holds 30 degree position for full 5 secs  7. Limb Ataxia: 0-->Absent  8. Sensory: 0-->Normal, no sensory loss  9. Best Language: 0-->No aphasia, normal  10. Dysarthria: 0-->Normal  11. Extinction and Inattention (formerly Neglect): 0-->No abnormality  Total (NIH Stroke Scale): 0    Kianna Stephens RN  07/16/24 12:32 EDT

## 2024-07-16 NOTE — ED PROVIDER NOTES
EMERGENCY DEPARTMENT ENCOUNTER    Room Number:  15/15  PCP: Freda Hall APRN    HPI:  Chief Complaint: Headache  A complete HPI/ROS/PMH/PSH/SH/FH are unobtainable due to: None  Context: Adryan Olivarez is a 68 y.o. male who presents to the ED c/o acute headache.  She he complains of having a generalized headache to the top of the head that feels like stabbing pain.  This is intermittent pain that will come for about 2 hours at a time and occur on most days.  No fever.  No vision change.  Nothing makes this worse or better that he has noticed.  He gets dialysis Monday, Wednesday, Friday with last dialysis performed yesterday.        PAST MEDICAL HISTORY  Active Ambulatory Problems     Diagnosis Date Noted    MARGARITA (acute kidney injury) 02/24/2021    History of stroke 02/24/2021    HTN (hypertension) 02/24/2021    Anxiety disorder 02/24/2021    Hypertension 02/24/2021    ESRD on hemodialysis 02/27/2021    Uremia 07/13/2023    Anemia 07/14/2023    Noncompliance of patient with renal dialysis 07/14/2023     Resolved Ambulatory Problems     Diagnosis Date Noted    CKD (chronic kidney disease) stage 3, GFR 30-59 ml/min 02/24/2021    Hyperkalemia 02/24/2021     Past Medical History:   Diagnosis Date    Anxiety     Chronic kidney disease     Stroke 1989         PAST SURGICAL HISTORY  Past Surgical History:   Procedure Laterality Date    INSERTION HEMODIALYSIS CATHETER Right 2/27/2021    Procedure: TUNNELED DIAYLIS CATHETER PLACEMENT;  Surgeon: Fernando Bazzi MD;  Location: [facility];  Service: Vascular;  Laterality: Right;         FAMILY HISTORY  No family history on file.      SOCIAL HISTORY  Social History     Socioeconomic History    Marital status:    Tobacco Use    Smoking status: Never    Smokeless tobacco: Never   Vaping Use    Vaping status: Never Used   Substance and Sexual Activity    Alcohol use: No    Drug use: No    Sexual activity: Defer         ALLERGIES  Patient has no known  allergies.        REVIEW OF SYSTEMS  Review of Systems     Included in HPI  All systems reviewed and negative except for those discussed in HPI.       PHYSICAL EXAM  ED Triage Vitals   Temp Heart Rate Resp BP SpO2   07/16/24 0934 07/16/24 0934 07/16/24 0934 07/16/24 0938 07/16/24 0934   97 °F (36.1 °C) 82 16 157/74 97 %      Temp src Heart Rate Source Patient Position BP Location FiO2 (%)   -- -- -- -- --              Physical Exam      GENERAL: no acute distress  HENT: nares patent  EYES: no scleral icterus  CV: regular rhythm, normal rate, left upper extremity fistula  RESPIRATORY: normal effort  ABDOMEN: soft  MUSCULOSKELETAL: no deformity  NEURO:   Recent and remote memory functions are normal. The patient is attentive with normal concentration. Language is fluent. Speech is clear. The speech is non-dysarthric. Fund of knowledge is normal.   Symmetric smile with no facial droop.  Eyes close shut strongly bilaterally.  Symmetric eyebrow raise bilaterally.  EOMI, PERRL  CN II-XII grossly normal otherwise.  5/5 strength to extremities.  No pronator drift.  Intact FNF.  No meningismus.  PSYCH:  calm, cooperative  SKIN: warm, dry    Vital signs and nursing notes reviewed.          LAB RESULTS  Recent Results (from the past 24 hour(s))   Basic Metabolic Panel    Collection Time: 07/16/24 10:19 AM    Specimen: Blood   Result Value Ref Range    Glucose 106 (H) 65 - 99 mg/dL    BUN 47 (H) 8 - 23 mg/dL    Creatinine 7.18 (H) 0.76 - 1.27 mg/dL    Sodium 137 136 - 145 mmol/L    Potassium 5.9 (H) 3.5 - 5.2 mmol/L    Chloride 95 (L) 98 - 107 mmol/L    CO2 29.0 22.0 - 29.0 mmol/L    Calcium 8.6 8.6 - 10.5 mg/dL    BUN/Creatinine Ratio 6.5 (L) 7.0 - 25.0    Anion Gap 13.0 5.0 - 15.0 mmol/L    eGFR 7.8 (L) >60.0 mL/min/1.73   CBC Auto Differential    Collection Time: 07/16/24 10:19 AM    Specimen: Blood   Result Value Ref Range    WBC 8.22 3.40 - 10.80 10*3/mm3    RBC 3.81 (L) 4.14 - 5.80 10*6/mm3    Hemoglobin 12.0 (L) 13.0 -  17.7 g/dL    Hematocrit 36.1 (L) 37.5 - 51.0 %    MCV 94.8 79.0 - 97.0 fL    MCH 31.5 26.6 - 33.0 pg    MCHC 33.2 31.5 - 35.7 g/dL    RDW 12.1 (L) 12.3 - 15.4 %    RDW-SD 41.8 37.0 - 54.0 fl    MPV 9.2 6.0 - 12.0 fL    Platelets 208 140 - 450 10*3/mm3    Neutrophil % 68.4 42.7 - 76.0 %    Lymphocyte % 17.3 (L) 19.6 - 45.3 %    Monocyte % 10.3 5.0 - 12.0 %    Eosinophil % 3.4 0.3 - 6.2 %    Basophil % 0.5 0.0 - 1.5 %    Immature Grans % 0.1 0.0 - 0.5 %    Neutrophils, Absolute 5.62 1.70 - 7.00 10*3/mm3    Lymphocytes, Absolute 1.42 0.70 - 3.10 10*3/mm3    Monocytes, Absolute 0.85 0.10 - 0.90 10*3/mm3    Eosinophils, Absolute 0.28 0.00 - 0.40 10*3/mm3    Basophils, Absolute 0.04 0.00 - 0.20 10*3/mm3    Immature Grans, Absolute 0.01 0.00 - 0.05 10*3/mm3    nRBC 0.0 0.0 - 0.2 /100 WBC   ECG 12 Lead Electrolyte Imbalance    Collection Time: 07/16/24 11:16 AM   Result Value Ref Range    QT Interval 474 ms    QTC Interval 482 ms       Ordered the above labs and reviewed the results.        RADIOLOGY  CT Head Without Contrast    Result Date: 7/16/2024  EMERGENCY CT SCAN OF THE HEAD WITHOUT CONTRAST ON 07/16/2024  CLINICAL HISTORY: This is a 68-year-old male patient who has had a headache for 2 months.  TECHNIQUE: Spiral CT images were obtained from the base of the skull to the vertex without intravenous contrast. The images were reformatted and are submitted in 3 mm thick axial, sagittal and coronal CT sections with brain algorithm.  There are no prior head CTs or MRIs of the brain from [facility] for comparison.  FINDINGS: The brain parenchyma is normal in attenuation. The ventricles are normal in size. I see no focal mass effect. There is no midline shift. No extra-axial fluid collections are identified. There is no evidence of acute intracranial hemorrhage. The calvarium and the skull base are normal in appearance. The paranasal sinuses and the mastoid air cells and the middle ear cavities are clear. The  orbits are normal in appearance. There is some calcified atherosclerotic plaques in the intracranial segments of the distal vertebral arteries and the cavernous segments of the internal carotid arteries bilaterally.      There are calcified atherosclerotic plaques in the intracranial segments of the distal vertebral arteries and cavernous segments of the internal carotid arteries bilaterally. Otherwise this is a normal head CT. The etiology of this patient's headaches is not established on this exam.  Radiation dose reduction techniques were utilized, including automated exposure control and exposure modulation based on body size.        Ordered the above noted radiological studies. Reviewed by me in PACS.        MEDICATIONS GIVEN IN ER  Medications   sodium chloride 0.9 % flush 10 mL (has no administration in time range)   prochlorperazine (COMPAZINE) injection 10 mg (10 mg Intravenous Given 7/16/24 1027)   diphenhydrAMINE (BENADRYL) injection 25 mg (25 mg Intravenous Given 7/16/24 1027)   insulin regular (humuLIN R,novoLIN R) injection 5 Units (5 Units Intravenous Given 7/16/24 1137)   dextrose (D50W) (25 g/50 mL) IV injection 25 g (25 g Intravenous Given 7/16/24 1137)         ORDERS PLACED DURING THIS VISIT:  Orders Placed This Encounter   Procedures    Critical Care    CT Head Without Contrast    Basic Metabolic Panel    CBC Auto Differential    Basic Metabolic Panel    Magnesium    Phosphorus    Nephrology (on -call MD unless specified)    [facility] (on-call MD unless specified) Details    POC Glucose Q1H    ECG 12 Lead Electrolyte Imbalance    Insert Peripheral IV    Hemodialysis Inpatient    Initiate Observation Status    CBC & Differential         OUTPATIENT MEDICATION MANAGEMENT:  Current Facility-Administered Medications Ordered in Epic   Medication Dose Route Frequency Provider Last Rate Last Admin    sodium chloride 0.9 % flush 10 mL  10 mL Intravenous Fernando Lorenzana II, MD         Current Outpatient  Medications Ordered in Epic   Medication Sig Dispense Refill    Baclofen (LIORESAL) 5 MG tablet Take 1 tablet by mouth Daily.      calcium acetate (PHOS BINDER,) 667 MG capsule capsule Take 3 capsules by mouth 3 (Three) Times a Day.      hydrALAZINE (APRESOLINE) 50 MG tablet Take 1 tablet by mouth 3 (Three) Times a Day. 90 tablet 1    sennosides-docusate (Senna Plus) 8.6-50 MG per tablet Take 2 tablets by mouth Daily.      amLODIPine (NORVASC) 10 MG tablet Take 1 tablet by mouth Daily.      atorvastatin (LIPITOR) 20 MG tablet Take 1 tablet by mouth Daily.      Ergocalciferol (VITAMIN D2 PO) Take 1.25 mg by mouth.      linaclotide (LINZESS) 290 MCG capsule capsule Take 1 capsule by mouth Every Morning Before Breakfast.      Loratadine 10 MG capsule Take 1 capsule by mouth 1 (One) Time.      losartan (COZAAR) 50 MG tablet Take 1 tablet by mouth Daily. 30 tablet 1    PARoxetine (PAXIL) 40 MG tablet Take 1 tablet by mouth Every Morning.      sodium bicarbonate 650 MG tablet Take 1 tablet by mouth 4 (Four) Times a Day. 120 tablet 0    tamsulosin (FLOMAX) 0.4 MG capsule 24 hr capsule Take 1 capsule by mouth Every Night.         PROCEDURES  Critical Care    Performed by: Fernando Dias II, MD  Authorized by: Fernando Dias II, MD    Critical care provider statement:     Critical care time (minutes):  32    Critical care was necessary to treat or prevent imminent or life-threatening deterioration of the following conditions:  Metabolic crisis    Critical care was time spent personally by me on the following activities:  Ordering and performing treatments and interventions, ordering and review of laboratory studies, ordering and review of radiographic studies, pulse oximetry, re-evaluation of patient's condition, discussions with consultants, evaluation of patient's response to treatment, examination of patient and obtaining history from patient or surrogate            MEDICAL DECISION MAKING, PROGRESS, and  CONSULTS    Discussion below represents my analysis of pertinent findings related to patient's condition, differential diagnosis, treatment plan and final disposition.      Additional sources:  - Discussed/obtained information from independent historians: Son at bedside  Additional information was obtained to confirm the patient's history.      Differential diagnosis:    Differential diagnosis for headache includes but is not limited to:  - subarachnoid hemorrhage  - intracranial mass  - stroke  - RCVS  - meningitis  - glaucoma  - giant cell arteritis  - CO poisoning  - cerebral venous sinus thrombosis  - migraine             Independent interpretation of labs, radiology studies, and discussions with consultants:  ED Course as of 07/16/24 1212   Tue Jul 16, 2024   1103 Creatinine(!): 7.18 [TD]   1103 Potassium(!): 5.9 [TD]   1121 EKG independently interpreted by myself.  Time 11:16 AM.  Sinus rhythm.  Heart rate 62.  LAFB.  Peaked T waves in the precordial leads. [TD]   1150 I discussed the case with Dr. Berg, nephrology.  He recommends admission with plan for dialysis. [TD]   1210 I discussed the case with Dr. Rose, hospitalist.  He will admit. [TD]      ED Course User Index  [TD] Fernando Dias II, MD             DIAGNOSIS  Final diagnoses:   Hyperkalemia   Nonintractable headache, unspecified chronicity pattern, unspecified headache type         DISPOSITION  Admit      Latest Documented Vital Signs:  As of 12:12 EDT  BP- 173/83 HR- 62 Temp- 97 °F (36.1 °C) O2 sat- 98%      --    Please note that portions of this were completed with a voice recognition program.       Note Disclaimer: At [facility], we believe that sharing information builds trust and better relationships. You are receiving this note because you are receiving care at [facility] or recently visited. It is possible you will see health information before a provider has talked with you about it. This kind of information can be easy to  misunderstand. To help you fully understand what it means for your health, we urge you to discuss this note with your provider.         Fernando Dias II, MD  07/16/24 1216

## 2024-07-16 NOTE — CONSULTS
[facility]                                                                                             Nephrology Initial Consult Note    Patient Identification:  Name: Adryan Olivarez MRN: [id number]  Age: 68 y.o. : 1955  Sex: male  Date:2024    Requesting Physician: As per consult order.  Reason for Consultation: Hyperkalemia, ESRD  Information from:patient/ family/ chart      History of Present Illness: This is a 68 y.o. year old male with end-stage renal disease on dialysis .  He receives dialysis at [facility], nephrologist is Dr. Tovar, access is an upper extremity AV fistula.  Medical history otherwise significant for hypertension which she states has been difficult to control.  He has he came to the ER today with a headache, fairly severe with no lightheadedness dizziness, fevers or vision changes.  Blood pressures in the 170s in the ER he was incidentally noted to have hyperkalemia and was admitted for dialysis.  Patient was seen and examined on hemodialysis, achieving prescribed blood flow and dialysate flow rate, arterial venous pressure around 180, 2K bath, UF 2 L planned.    The following medical history and medications personally reviewed by me:    Problem List:     Hyperkalemia    HTN (hypertension)    ESRD on hemodialysis      Past Medical History:  Past Medical History:   Diagnosis Date    Anxiety     Chronic kidney disease     Stage 3    Hypertension     Stroke        Past Surgical History:  Past Surgical History:   Procedure Laterality Date    INSERTION HEMODIALYSIS CATHETER Right 2021    Procedure: TUNNELED DIAYLIS CATHETER PLACEMENT;  Surgeon: Fernando Bazzi MD;  Location: [facility];  Service: Vascular;  Laterality: Right;        Home Meds:   (Not in a hospital admission)      Current Meds:   Current Facility-Administered  Medications   Medication Dose Route Frequency Provider Last Rate Last Admin    acetaminophen (TYLENOL) tablet 650 mg  650 mg Oral Q4H PRN Nicolás Sheldon MD        Or    acetaminophen (TYLENOL) 160 MG/5ML oral solution 650 mg  650 mg Oral Q4H PRN Nicolás Sheldon MD        Or    acetaminophen (TYLENOL) suppository 650 mg  650 mg Rectal Q4H PRN Nicolás Sheldon MD        baclofen (LIORESAL) tablet 5 mg  5 mg Oral Daily PRN Nicolás Sheldon MD        sennosides-docusate (PERICOLACE) 8.6-50 MG per tablet 2 tablet  2 tablet Oral BID PRN Nicolás Sheldon MD        And    polyethylene glycol (MIRALAX) packet 17 g  17 g Oral Daily PRN Nicolás Sheldon MD        And    bisacodyl (DULCOLAX) EC tablet 5 mg  5 mg Oral Daily PRN Nicolás Sheldon MD        And    bisacodyl (DULCOLAX) suppository 10 mg  10 mg Rectal Daily PRN Nicolás Sheldon MD        heparin (porcine) 5000 UNIT/ML injection 5,000 Units  5,000 Units Subcutaneous Q12H Nicolás Sheldon MD        hydrALAZINE (APRESOLINE) tablet 50 mg  50 mg Oral TID Nicolás Sheldon MD        nitroglycerin (NITROSTAT) SL tablet 0.4 mg  0.4 mg Sublingual Q5 Min PRN Nicolás Sheldon MD        ondansetron ODT (ZOFRAN-ODT) disintegrating tablet 4 mg  4 mg Oral Q6H PRN Nicolás Sheldon MD        Or    ondansetron (ZOFRAN) injection 4 mg  4 mg Intravenous Q6H PRN Nicolás Sheldon MD        sodium chloride 0.9 % flush 10 mL  10 mL Intravenous PRN Fernando Dias II, MD        sodium chloride 0.9 % flush 10 mL  10 mL Intravenous Q12H Nicolás Sheldon MD        sodium chloride 0.9 % flush 10 mL  10 mL Intravenous PRN Nicolás Sheldon MD        sodium chloride 0.9 % infusion 40 mL  40 mL Intravenous PRN Nicolás Sheldon MD         Current Outpatient Medications   Medication Sig Dispense Refill    Baclofen (LIORESAL) 5 MG tablet Take 1 tablet by mouth Daily.      calcium acetate (PHOS BINDER,) 667 MG capsule capsule Take 3 capsules by  "mouth 3 (Three) Times a Day.      hydrALAZINE (APRESOLINE) 50 MG tablet Take 1 tablet by mouth 3 (Three) Times a Day. 90 tablet 1    sennosides-docusate (Senna Plus) 8.6-50 MG per tablet Take 2 tablets by mouth Daily.         Allergies:  No Known Allergies    Social History:   Social History     Socioeconomic History    Marital status:    Tobacco Use    Smoking status: Never    Smokeless tobacco: Never   Vaping Use    Vaping status: Never Used   Substance and Sexual Activity    Alcohol use: No    Drug use: No    Sexual activity: Defer        Family History:  No family history on file.     Review of Systems: as per HPI, in addition:    General:      Denies weakness / fatigue,                       No fevers / chills                       no weight loss  HEENT;       no dysphagia or visual changes, + headache  Neck:           normal range of motion, no swelling  Respiratory: no cough / congestion                      No shortness of air                       No wheezing  CV:              No chest pain                       No palpitations  Abdomen/GI: no nausea / vomiting                      No diarrhea / constipation                      No abdominal pain  :             no dysuria / urinary frequency                       No urgency, normal output  Endocrine:   no polyuria / polydipsia,                      No heat or cold intolerance  Skin:           Denies rashes or skin lesions   Vascular:   No edema  Musculoskeletal: Denies joint pain or deformities      Physical Exam:  Vitals:   Temp (24hrs), Av °F (36.1 °C), Min:97 °F (36.1 °C), Max:97 °F (36.1 °C)    /83   Pulse 62   Temp 97 °F (36.1 °C)   Resp 16   Ht 177.8 cm (70\")   Wt 101 kg (222 lb)   SpO2 98%   BMI 31.85 kg/m²   Intake/Output:   No intake or output data in the 24 hours ending 24 1531     Wt Readings from Last 1 Encounters:   24 0937 101 kg (222 lb)       Exam:    General Appearance:  Awake, alert, no acute " distress  Well-appearing   Head and Face:  Normocephalic, atraumatic, mucus membranes moist, oropharynx clear   Eyes:  No icterus, pupils equal round and reactive to light, extraocular movements intact    ENMT: Moist mucosa, tongue symmetric    Neck: Supple  no jugular venous distention  no thyromegaly   Pulmonary:  Respiratory effort: Normal  Auscultation of lungs: Clear bilaterally  No wheezes  No rhonchi  Good air movement, good expansion   Chest wall:  No tenderness or deformity   Cardiovascular:  Auscultation of the heart: Normal rhythm, no murmurs  No edema of bilateral lower extremities   Abdomen:  Abdomen: soft, non-tender, normal bowel sounds all four quadrants, no masses   Liver and spleen: no hepatosplenomegaly   Musculoskeletal: Digits and nails: normal  Normal range of motion  No joint swelling or gross deformities    Skin: Skin inspection: color normal, no visible rashes or lesions  Skin palpation: texture, turgor normal, no palpable lesions   Lymphatic:  no cervical lymphadenopathy    Psychiatric: Judgement and insight: normal  Orientation to person place and time: normal  Mood and affect: normal     Hyperkalemia, ESRD  DATA:  Radiology and Labs:   The following labs independently reviewed by me, additional AM labs ordered  Old records independently reviewed showing hyperkalemia, ESRD  The following radiologic studies independently viewed by me, findings heart  Interval notes, chart personally reviewed by me.  I have reviewed and summarized old records as detailed above  Plan of care discussed with CT head showed atherosclerotic plaques otherwise normal CT head  New problems include hyperkalemia with renal failure    Dialysis patient access: AV fistula    Risk/ complexity of medical care/ medical decision making: Moderate complexity, dialysis management  Chronic illness with severe exacerbation or progression      Labs:   Recent Results (from the past 24 hour(s))   Basic Metabolic Panel    Collection  Time: 07/16/24 10:19 AM    Specimen: Blood   Result Value Ref Range    Glucose 106 (H) 65 - 99 mg/dL    BUN 47 (H) 8 - 23 mg/dL    Creatinine 7.18 (H) 0.76 - 1.27 mg/dL    Sodium 137 136 - 145 mmol/L    Potassium 5.9 (H) 3.5 - 5.2 mmol/L    Chloride 95 (L) 98 - 107 mmol/L    CO2 29.0 22.0 - 29.0 mmol/L    Calcium 8.6 8.6 - 10.5 mg/dL    BUN/Creatinine Ratio 6.5 (L) 7.0 - 25.0    Anion Gap 13.0 5.0 - 15.0 mmol/L    eGFR 7.8 (L) >60.0 mL/min/1.73   CBC Auto Differential    Collection Time: 07/16/24 10:19 AM    Specimen: Blood   Result Value Ref Range    WBC 8.22 3.40 - 10.80 10*3/mm3    RBC 3.81 (L) 4.14 - 5.80 10*6/mm3    Hemoglobin 12.0 (L) 13.0 - 17.7 g/dL    Hematocrit 36.1 (L) 37.5 - 51.0 %    MCV 94.8 79.0 - 97.0 fL    MCH 31.5 26.6 - 33.0 pg    MCHC 33.2 31.5 - 35.7 g/dL    RDW 12.1 (L) 12.3 - 15.4 %    RDW-SD 41.8 37.0 - 54.0 fl    MPV 9.2 6.0 - 12.0 fL    Platelets 208 140 - 450 10*3/mm3    Neutrophil % 68.4 42.7 - 76.0 %    Lymphocyte % 17.3 (L) 19.6 - 45.3 %    Monocyte % 10.3 5.0 - 12.0 %    Eosinophil % 3.4 0.3 - 6.2 %    Basophil % 0.5 0.0 - 1.5 %    Immature Grans % 0.1 0.0 - 0.5 %    Neutrophils, Absolute 5.62 1.70 - 7.00 10*3/mm3    Lymphocytes, Absolute 1.42 0.70 - 3.10 10*3/mm3    Monocytes, Absolute 0.85 0.10 - 0.90 10*3/mm3    Eosinophils, Absolute 0.28 0.00 - 0.40 10*3/mm3    Basophils, Absolute 0.04 0.00 - 0.20 10*3/mm3    Immature Grans, Absolute 0.01 0.00 - 0.05 10*3/mm3    nRBC 0.0 0.0 - 0.2 /100 WBC   Magnesium    Collection Time: 07/16/24 10:19 AM    Specimen: Blood   Result Value Ref Range    Magnesium 2.7 (H) 1.6 - 2.4 mg/dL   Phosphorus    Collection Time: 07/16/24 10:19 AM    Specimen: Blood   Result Value Ref Range    Phosphorus 4.6 (H) 2.5 - 4.5 mg/dL   ECG 12 Lead Electrolyte Imbalance    Collection Time: 07/16/24 11:16 AM   Result Value Ref Range    QT Interval 474 ms    QTC Interval 482 ms       Radiology:  Imaging Results (Last 24 Hours)       Procedure Component Value Units  Date/Time    CT Head Without Contrast [[id number]] Collected: 07/16/24 1135     Updated: 07/16/24 1302    Narrative:      EMERGENCY CT SCAN OF THE HEAD WITHOUT CONTRAST ON 07/16/2024     CLINICAL HISTORY: This is a 68-year-old male patient who has had a  headache for 2 months.     TECHNIQUE: Spiral CT images were obtained from the base of the skull to  the vertex without intravenous contrast. The images were reformatted and  are submitted in 3 mm thick axial, sagittal and coronal CT sections with  brain algorithm.     There are no prior head CTs or MRIs of the brain from [facility] for comparison.     FINDINGS: The brain parenchyma is normal in attenuation. The ventricles  are normal in size. I see no focal mass effect. There is no midline  shift. No extra-axial fluid collections are identified. There is no  evidence of acute intracranial hemorrhage. The calvarium and the skull  base are normal in appearance. The paranasal sinuses and the mastoid air  cells and the middle ear cavities are clear. The orbits are normal in  appearance. There is some calcified atherosclerotic plaques in the  intracranial segments of the distal vertebral arteries and the cavernous  segments of the internal carotid arteries bilaterally.       Impression:         1. There are calcified atherosclerotic plaques in the intracranial  segments of the distal vertebral arteries and cavernous segments of the  internal carotid arteries bilaterally. Otherwise, this is a normal head  CT. The etiology of this patient's headaches is not established on this  exam.     Radiation dose reduction techniques were utilized, including automated  exposure control and exposure modulation based on body size.        This report was finalized on 7/16/2024 12:59 PM by Dr. Maximo Gonzalez M.D  on Workstation: [id number]                    ASSESSMENT:     Hyperkalemia    HTN (hypertension)    ESRD on hemodialysis  Headache with negative CT head  Anemia  of chronic kidney disease      DISCUSSION/PLAN:   His potassium was 5.9 on chemistries.  He is being admitted for hemodialysis  Will run on a 2K bath, 3-1/2 hours.  UF 2 L  BP has improved with dialysis treatment  Resume home BP meds  Phosphorus binders with meals  Hemoglobin above threshold for starting RAGHAV    Continue to monitor electrolytes and volume closely  Renal, low potassium diet  Depending on his labs in the morning we will need to decide what his dialysis schedule will be for the rest of the week.    I appreciate the consult request.  Please send me a secure chat message with any nonurgent questions regarding patient care.  For any urgent patient care issues please call my office number below.      Win Berg MD  [facility]  Office phone number: [phone]  Answering service phone number: [phone]      7/16/2024        Dictation via Dragon dictation software

## 2024-07-16 NOTE — PROGRESS NOTES
Clinical Pharmacy Services: Medication History    Adryan Olivarez is a 68 y.o. male presenting to [facility] for   Chief Complaint   Patient presents with    Headache       He  has a past medical history of Anxiety, Chronic kidney disease, Hypertension, and Stroke (1989).    Allergies as of 07/16/2024    (No Known Allergies)       Medication information was obtained from: Medication Bottles   Pharmacy and Phone Number:     Prior to Admission Medications       Prescriptions Last Dose Informant Patient Reported? Taking?    Baclofen (LIORESAL) 5 MG tablet  Medication Bottle Yes Yes    Take 1 tablet by mouth Daily.    calcium acetate (PHOS BINDER,) 667 MG capsule capsule  Medication Bottle Yes Yes    Take 3 capsules by mouth 3 (Three) Times a Day.    hydrALAZINE (APRESOLINE) 50 MG tablet  Medication Bottle No Yes    Take 1 tablet by mouth 3 (Three) Times a Day.    sennosides-docusate (Senna Plus) 8.6-50 MG per tablet  Medication Bottle Yes Yes    Take 2 tablets by mouth Daily.    amLODIPine (NORVASC) 10 MG tablet   Yes No    Take 1 tablet by mouth Daily.    atorvastatin (LIPITOR) 20 MG tablet   Yes No    Take 1 tablet by mouth Daily.    Ergocalciferol (VITAMIN D2 PO)   Yes No    Take 1.25 mg by mouth.    linaclotide (LINZESS) 290 MCG capsule capsule   Yes No    Take 1 capsule by mouth Every Morning Before Breakfast.    Loratadine 10 MG capsule  Self Yes No    Take 1 capsule by mouth 1 (One) Time.    losartan (COZAAR) 50 MG tablet   No No    Take 1 tablet by mouth Daily.    PARoxetine (PAXIL) 40 MG tablet  Self Yes No    Take 1 tablet by mouth Every Morning.    sodium bicarbonate 650 MG tablet   No No    Take 1 tablet by mouth 4 (Four) Times a Day.    tamsulosin (FLOMAX) 0.4 MG capsule 24 hr capsule   Yes No    Take 1 capsule by mouth Every Night.              Medication notes: Patient has medications bottles in room. Patient stated he only taking the 4 meds listed.     This medication list is complete to the  best of my knowledge as of 7/16/2024    Please call if questions.    Wilner Grant  Medication History Technician   [phone]    7/16/2024 12:10 EDT

## 2024-07-17 ENCOUNTER — APPOINTMENT (OUTPATIENT)
Dept: MRI IMAGING | Facility: HOSPITAL | Age: 69
End: 2024-07-17
Payer: MEDICARE

## 2024-07-17 LAB
ALBUMIN SERPL-MCNC: 3.8 G/DL (ref 3.5–5.2)
ALBUMIN/GLOB SERPL: 1.2 G/DL
ALP SERPL-CCNC: 88 U/L (ref 39–117)
ALT SERPL W P-5'-P-CCNC: 14 U/L (ref 1–41)
ANION GAP SERPL CALCULATED.3IONS-SCNC: 13.7 MMOL/L (ref 5–15)
AST SERPL-CCNC: 11 U/L (ref 1–40)
BILIRUB SERPL-MCNC: 0.3 MG/DL (ref 0–1.2)
BUN SERPL-MCNC: 31 MG/DL (ref 8–23)
BUN/CREAT SERPL: 5.8 (ref 7–25)
CALCIUM SPEC-SCNC: 8.4 MG/DL (ref 8.6–10.5)
CHLORIDE SERPL-SCNC: 96 MMOL/L (ref 98–107)
CO2 SERPL-SCNC: 27.3 MMOL/L (ref 22–29)
CREAT SERPL-MCNC: 5.32 MG/DL (ref 0.76–1.27)
DEPRECATED RDW RBC AUTO: 42.5 FL (ref 37–54)
EGFRCR SERPLBLD CKD-EPI 2021: 11.2 ML/MIN/1.73
ERYTHROCYTE [DISTWIDTH] IN BLOOD BY AUTOMATED COUNT: 12.3 % (ref 12.3–15.4)
GLOBULIN UR ELPH-MCNC: 3.2 GM/DL
GLUCOSE SERPL-MCNC: 83 MG/DL (ref 65–99)
HBV SURFACE AG SERPL QL IA: ABNORMAL
HCT VFR BLD AUTO: 35.8 % (ref 37.5–51)
HGB BLD-MCNC: 12.4 G/DL (ref 13–17.7)
MAGNESIUM SERPL-MCNC: 2.6 MG/DL (ref 1.6–2.4)
MCH RBC QN AUTO: 32.7 PG (ref 26.6–33)
MCHC RBC AUTO-ENTMCNC: 34.6 G/DL (ref 31.5–35.7)
MCV RBC AUTO: 94.5 FL (ref 79–97)
PHOSPHATE SERPL-MCNC: 5.1 MG/DL (ref 2.5–4.5)
PLATELET # BLD AUTO: 209 10*3/MM3 (ref 140–450)
PMV BLD AUTO: 9.5 FL (ref 6–12)
POTASSIUM SERPL-SCNC: 4.2 MMOL/L (ref 3.5–5.2)
POTASSIUM SERPL-SCNC: 5 MMOL/L (ref 3.5–5.2)
PROT SERPL-MCNC: 7 G/DL (ref 6–8.5)
QT INTERVAL: 406 MS
QT INTERVAL: 458 MS
QTC INTERVAL: 472 MS
QTC INTERVAL: 484 MS
RBC # BLD AUTO: 3.79 10*6/MM3 (ref 4.14–5.8)
SODIUM SERPL-SCNC: 137 MMOL/L (ref 136–145)
WBC NRBC COR # BLD AUTO: 8.13 10*3/MM3 (ref 3.4–10.8)

## 2024-07-17 PROCEDURE — 0 GADOBENATE DIMEGLUMINE 529 MG/ML SOLUTION: Performed by: INTERNAL MEDICINE

## 2024-07-17 PROCEDURE — 36415 COLL VENOUS BLD VENIPUNCTURE: CPT | Performed by: INTERNAL MEDICINE

## 2024-07-17 PROCEDURE — 84100 ASSAY OF PHOSPHORUS: CPT | Performed by: INTERNAL MEDICINE

## 2024-07-17 PROCEDURE — 93005 ELECTROCARDIOGRAM TRACING: CPT | Performed by: INTERNAL MEDICINE

## 2024-07-17 PROCEDURE — 84132 ASSAY OF SERUM POTASSIUM: CPT | Performed by: INTERNAL MEDICINE

## 2024-07-17 PROCEDURE — G0378 HOSPITAL OBSERVATION PER HR: HCPCS

## 2024-07-17 PROCEDURE — 96372 THER/PROPH/DIAG INJ SC/IM: CPT

## 2024-07-17 PROCEDURE — 80053 COMPREHEN METABOLIC PANEL: CPT | Performed by: INTERNAL MEDICINE

## 2024-07-17 PROCEDURE — 83735 ASSAY OF MAGNESIUM: CPT | Performed by: INTERNAL MEDICINE

## 2024-07-17 PROCEDURE — 87340 HEPATITIS B SURFACE AG IA: CPT | Performed by: INTERNAL MEDICINE

## 2024-07-17 PROCEDURE — 70553 MRI BRAIN STEM W/O & W/DYE: CPT

## 2024-07-17 PROCEDURE — A9577 INJ MULTIHANCE: HCPCS | Performed by: INTERNAL MEDICINE

## 2024-07-17 PROCEDURE — 99213 OFFICE O/P EST LOW 20 MIN: CPT | Performed by: PSYCHIATRY & NEUROLOGY

## 2024-07-17 PROCEDURE — 87341 HEP B SURFACE AG NEUTRLZJ IA: CPT | Performed by: INTERNAL MEDICINE

## 2024-07-17 PROCEDURE — 85027 COMPLETE CBC AUTOMATED: CPT | Performed by: INTERNAL MEDICINE

## 2024-07-17 PROCEDURE — G0257 UNSCHED DIALYSIS ESRD PT HOS: HCPCS

## 2024-07-17 PROCEDURE — 25010000002 HEPARIN (PORCINE) PER 1000 UNITS: Performed by: INTERNAL MEDICINE

## 2024-07-17 RX ORDER — HYDRALAZINE HYDROCHLORIDE 50 MG/1
100 TABLET, FILM COATED ORAL 3 TIMES DAILY
Status: DISCONTINUED | OUTPATIENT
Start: 2024-07-17 | End: 2024-07-18 | Stop reason: HOSPADM

## 2024-07-17 RX ADMIN — CALCIUM ACETATE 1334 MG: 667 CAPSULE ORAL at 08:43

## 2024-07-17 RX ADMIN — HEPARIN SODIUM 5000 UNITS: 5000 INJECTION INTRAVENOUS; SUBCUTANEOUS at 22:36

## 2024-07-17 RX ADMIN — HEPARIN SODIUM 5000 UNITS: 5000 INJECTION INTRAVENOUS; SUBCUTANEOUS at 08:43

## 2024-07-17 RX ADMIN — HYDRALAZINE HYDROCHLORIDE 100 MG: 50 TABLET ORAL at 22:36

## 2024-07-17 RX ADMIN — Medication 10 ML: at 08:43

## 2024-07-17 RX ADMIN — HYDRALAZINE HYDROCHLORIDE 50 MG: 50 TABLET ORAL at 08:43

## 2024-07-17 RX ADMIN — Medication 10 ML: at 22:36

## 2024-07-17 RX ADMIN — GADOBENATE DIMEGLUMINE 20 ML: 529 INJECTION, SOLUTION INTRAVENOUS at 13:35

## 2024-07-17 NOTE — PLAN OF CARE
Goal Outcome Evaluation:  Plan of Care Reviewed With: patient        Progress: improving  Outcome Evaluation: vss, no complaints pain. pt rested well during shift. mri screening sheet to be completed when family his here, labs in am.

## 2024-07-17 NOTE — PAYOR COMM NOTE
"Adryan Olivarez (68 y.o. Male)             ATTENTION; OBSERVATION AUTH REQUEST - ICD 10  - E87.5              FACILITY NPI - [id number] [facility], [address] [facility] [address]            PHYSICIAN NPI -   [id number] [address] [facility] [address] [facility] [address]           REPLY TO UR DEPT  [phone] OR CALL   BRIANNE PAYTON LPN           Date of Birth   1955    Social Security Number       Address   [address] [place] [address]    Home Phone   [phone]    MRN   [id number]       Amish   None    Marital Status                               Admission Date   7/16/24    Admission Type   Emergency    Admitting Provider   Nicolás Cuevas MD    Attending Provider   Nicolás Cuevas MD    Department, Room/Bed   [facility], S516/1       Discharge Date       Discharge Disposition       Discharge Destination                                 Attending Provider: Nicolás Cuevas MD    Allergies: No Known Allergies    Isolation: None   Infection: None   Code Status: CPR    Ht: 177.8 cm (70\")   Wt: 101 kg (222 lb)    Admission Cmt: None   Principal Problem: Hyperkalemia [E87.5]                   Active Insurance as of 7/16/2024       Primary Coverage       Payor Plan Insurance Group Employer/Plan Group    [facility] WELL[employer] MED ADV HMO        Payor Plan Address Payor Plan Phone Number Payor Plan Fax Number Effective Dates    [address] [address]   7/16/2024 - None Entered    [facility] [id number]         Subscriber Name Subscriber Birth Date Member ID       ADRYAN OLIVAREZ 1955 [id number]                     Emergency Contacts        (Rel.) Home Phone Work Phone Mobile Phone    Nura Olivarez (Son) -- -- [phone]    KEVIN OLIVAREZ (Son) [phone] -- --                 History & Physical        Nicolás Cuevas MD at 07/16/24 1507              Patient Name:  Adryan Olivarez  Date of Birth:  " 1955  MRN:  [id number]  Admit Date:  7/16/2024  Patient Care Team:  Freda Hall APRN as PCP - General (Family Medicine)      Subjective  History Present Illness     Chief Complaint   Patient presents with    Headache       Mr. Olivarez is a 68 y.o. with a history of hypertension ESRD who presents to [facility] complaining of 3 days of acute headache that was intermittent.  He reported that it was stabbing and would come and go over the course of several hours.  He did not notice any fever or vision change.  No nausea or vomiting reported.  I saw him in dialysis and he is not having headache currently.  He was found to be hyperkalemic in the emergency room and nephrology was consulted.  He is now getting acute dialysis.      Review of Systems   Constitutional:  Negative for diaphoresis and fever.   HENT:  Negative for sore throat and trouble swallowing.    Respiratory:  Negative for cough and shortness of breath.    Cardiovascular:  Negative for chest pain and palpitations.   Gastrointestinal:  Negative for diarrhea, nausea and vomiting.   Endocrine: Negative for cold intolerance and heat intolerance.   Genitourinary:  Negative for dysuria and flank pain.   Musculoskeletal:  Negative for neck pain and neck stiffness.   Skin:  Negative for pallor and rash.   Allergic/Immunologic: Negative for environmental allergies and food allergies.   Neurological:  Negative for seizures and syncope.   Hematological:  Negative for adenopathy. Does not bruise/bleed easily.   Psychiatric/Behavioral:  Negative for agitation and confusion.         Personal History     Past Medical History:   Diagnosis Date    Anxiety     Chronic kidney disease     Stage 3    Hypertension     Stroke 1989     Past Surgical History:   Procedure Laterality Date    INSERTION HEMODIALYSIS CATHETER Right 2/27/2021    Procedure: TUNNELED DIAYLIS CATHETER PLACEMENT;  Surgeon: Fernando Bazzi MD;  Location: [facility];  Service:  Vascular;  Laterality: Right;     No family history on file.  Social History     Tobacco Use    Smoking status: Never    Smokeless tobacco: Never   Vaping Use    Vaping status: Never Used   Substance Use Topics    Alcohol use: No    Drug use: No     No current facility-administered medications on file prior to encounter.     Current Outpatient Medications on File Prior to Encounter   Medication Sig Dispense Refill    Baclofen (LIORESAL) 5 MG tablet Take 1 tablet by mouth Daily.      calcium acetate (PHOS BINDER,) 667 MG capsule capsule Take 3 capsules by mouth 3 (Three) Times a Day.      hydrALAZINE (APRESOLINE) 50 MG tablet Take 1 tablet by mouth 3 (Three) Times a Day. 90 tablet 1    sennosides-docusate (Senna Plus) 8.6-50 MG per tablet Take 2 tablets by mouth Daily.      [DISCONTINUED] amLODIPine (NORVASC) 10 MG tablet Take 1 tablet by mouth Daily.      [DISCONTINUED] atorvastatin (LIPITOR) 20 MG tablet Take 1 tablet by mouth Daily.      [DISCONTINUED] Ergocalciferol (VITAMIN D2 PO) Take 1.25 mg by mouth.      [DISCONTINUED] linaclotide (LINZESS) 290 MCG capsule capsule Take 1 capsule by mouth Every Morning Before Breakfast.      [DISCONTINUED] Loratadine 10 MG capsule Take 1 capsule by mouth 1 (One) Time.      [DISCONTINUED] losartan (COZAAR) 50 MG tablet Take 1 tablet by mouth Daily. 30 tablet 1    [DISCONTINUED] PARoxetine (PAXIL) 40 MG tablet Take 1 tablet by mouth Every Morning.      [DISCONTINUED] sodium bicarbonate 650 MG tablet Take 1 tablet by mouth 4 (Four) Times a Day. 120 tablet 0    [DISCONTINUED] tamsulosin (FLOMAX) 0.4 MG capsule 24 hr capsule Take 1 capsule by mouth Every Night.       No Known Allergies    Objective   Objective     Vital Signs  Temp:  [97 °F (36.1 °C)] 97 °F (36.1 °C)  Heart Rate:  [56-82] 62  Resp:  [16] 16  BP: (152-173)/(74-83) 173/83  SpO2:  [94 %-100 %] 98 %  on   ;      Body mass index is 31.85 kg/m².    Physical Exam  Vitals and nursing note reviewed.   Constitutional:        General: He is not in acute distress.     Appearance: He is not diaphoretic.   HENT:      Head: Atraumatic.   Eyes:      Conjunctiva/sclera: Conjunctivae normal.      Pupils: Pupils are equal, round, and reactive to light.   Cardiovascular:      Rate and Rhythm: Normal rate and regular rhythm.      Pulses: Normal pulses.   Pulmonary:      Effort: Pulmonary effort is normal.      Breath sounds: No wheezing.   Abdominal:      General: There is no distension.      Palpations: Abdomen is soft.      Tenderness: There is no abdominal tenderness. There is no guarding or rebound.   Musculoskeletal:         General: No tenderness.   Skin:     General: Skin is warm and dry.   Neurological:      Mental Status: He is alert. Mental status is at baseline.   Psychiatric:         Mood and Affect: Mood normal.         Behavior: Behavior normal.         Results Review:  I reviewed the patient's new clinical results.  I reviewed the patient's new imaging results and agree with the interpretation.  I personally viewed and interpreted the patient's EKG/Telemetry data  I reviewed prior records.    Lab Results (last 24 hours)       Procedure Component Value Units Date/Time    CBC & Differential [[id number]]  (Abnormal) Collected: 07/16/24 1019    Specimen: Blood Updated: 07/16/24 1028    Narrative:      The following orders were created for panel order CBC & Differential.  Procedure                               Abnormality         Status                     ---------                               -----------         ------                     CBC Auto Differential[[id number]]        Abnormal            Final result                 Please view results for these tests on the individual orders.    Basic Metabolic Panel [[id number]]  (Abnormal) Collected: 07/16/24 1019    Specimen: Blood Updated: 07/16/24 1043     Glucose 106 mg/dL      BUN 47 mg/dL      Creatinine 7.18 mg/dL      Sodium 137 mmol/L      Potassium 5.9 mmol/L      Chloride 95  mmol/L      CO2 29.0 mmol/L      Calcium 8.6 mg/dL      BUN/Creatinine Ratio 6.5     Anion Gap 13.0 mmol/L      eGFR 7.8 mL/min/1.73      Comment: <15 Indicative of kidney failure       Narrative:      GFR Normal >60  Chronic Kidney Disease <60  Kidney Failure <15      CBC Auto Differential [[id number]]  (Abnormal) Collected: 07/16/24 1019    Specimen: Blood Updated: 07/16/24 1028     WBC 8.22 10*3/mm3      RBC 3.81 10*6/mm3      Hemoglobin 12.0 g/dL      Hematocrit 36.1 %      MCV 94.8 fL      MCH 31.5 pg      MCHC 33.2 g/dL      RDW 12.1 %      RDW-SD 41.8 fl      MPV 9.2 fL      Platelets 208 10*3/mm3      Neutrophil % 68.4 %      Lymphocyte % 17.3 %      Monocyte % 10.3 %      Eosinophil % 3.4 %      Basophil % 0.5 %      Immature Grans % 0.1 %      Neutrophils, Absolute 5.62 10*3/mm3      Lymphocytes, Absolute 1.42 10*3/mm3      Monocytes, Absolute 0.85 10*3/mm3      Eosinophils, Absolute 0.28 10*3/mm3      Basophils, Absolute 0.04 10*3/mm3      Immature Grans, Absolute 0.01 10*3/mm3      nRBC 0.0 /100 WBC     Magnesium [[id number]]  (Abnormal) Collected: 07/16/24 1019    Specimen: Blood Updated: 07/16/24 1233     Magnesium 2.7 mg/dL     Phosphorus [[id number]]  (Abnormal) Collected: 07/16/24 1019    Specimen: Blood Updated: 07/16/24 1233     Phosphorus 4.6 mg/dL             Imaging Results (Last 24 Hours)       Procedure Component Value Units Date/Time    CT Head Without Contrast [[id number]] Collected: 07/16/24 1135     Updated: 07/16/24 1302    Narrative:      EMERGENCY CT SCAN OF THE HEAD WITHOUT CONTRAST ON 07/16/2024     CLINICAL HISTORY: This is a 68-year-old male patient who has had a  headache for 2 months.     TECHNIQUE: Spiral CT images were obtained from the base of the skull to  the vertex without intravenous contrast. The images were reformatted and  are submitted in 3 mm thick axial, sagittal and coronal CT sections with  brain algorithm.     There are no prior head CTs or MRIs of the brain from  [facility] for comparison.     FINDINGS: The brain parenchyma is normal in attenuation. The ventricles  are normal in size. I see no focal mass effect. There is no midline  shift. No extra-axial fluid collections are identified. There is no  evidence of acute intracranial hemorrhage. The calvarium and the skull  base are normal in appearance. The paranasal sinuses and the mastoid air  cells and the middle ear cavities are clear. The orbits are normal in  appearance. There is some calcified atherosclerotic plaques in the  intracranial segments of the distal vertebral arteries and the cavernous  segments of the internal carotid arteries bilaterally.       Impression:         1. There are calcified atherosclerotic plaques in the intracranial  segments of the distal vertebral arteries and cavernous segments of the  internal carotid arteries bilaterally. Otherwise, this is a normal head  CT. The etiology of this patient's headaches is not established on this  exam.     Radiation dose reduction techniques were utilized, including automated  exposure control and exposure modulation based on body size.        This report was finalized on 7/16/2024 12:59 PM by Dr. Maximo Gonzalez M.D  on Workstation: [id number]               Results for orders placed during the hospital encounter of 02/23/21    Adult Transthoracic Echo Complete W/ Cont if Necessary Per Protocol    Interpretation Summary  · Estimated left ventricular EF = 63% Left ventricular systolic function is normal.  · Left ventricular diastolic function was normal.  · Mild tricuspid valve regurgitation is present. Estimated right ventricular systolic pressure from tricuspid regurgitation is mildly elevated (35-45 mmHg). Calculated right ventricular systolic pressure from tricuspid regurgitation is 40.5 mmHg.      ECG 12 Lead Electrolyte Imbalance   Final Result   HEART RATE=62  bpm   RR Egdunuba=330  ms   VT Ukcizatk=555  ms   P Horizontal Axis=42  deg    P Front Axis=22  deg   QRSD Shwjecuw=701  ms   QT Ciqjulle=046  ms   YAgR=282  ms   QRS Axis=-62  deg   T Wave Axis=28  deg   - ABNORMAL ECG -   Sinus rhythm   Left anterior fascicular block   Abnormal R-wave progression, late transition   Minimal ST elevation, lateral leads   Borderline  prolonged QT interval   No change from previous tracing   Electronically Signed By: [facility]) ([facility]) 2024-07-16 12:30:33   Date and Time of Study:2024-07-16 11:16:42           Assessment/Plan     Active Hospital Problems    Diagnosis  POA    Hyperkalemia [E87.5]  Yes      Resolved Hospital Problems   No resolved problems to display.       Mr. Olivarez is a 68 y.o.     ESRD: Hyperkalemia.  Undergoing dialysis now.  Consult nephrology.  Headache: No acute changes were seen on CT.  Since he has had persistent headache for multiple days we will ask neurology to review.  Hypertension: Resume home regimen and monitor  PPx: Heparin subcutaneous  I discussed the patient's findings and my recommendations with patient and ED provider.      Nicolás Sheldon MD  [facility]  07/16/24  15:12 EDT    Dictated portions of note using dragon dictation software.    Electronically signed by Nicolás Sheldon MD at 07/16/24 1516          Emergency Department Notes        Anson Tafoya RN at 07/16/24 1236          Nursing report ED to floor  Adryan Olivarez  68 y.o.  male    HPI :  HPI (Adult)  Stated Reason for Visit: headache    Chief Complaint  Chief Complaint   Patient presents with    Headache       Admitting doctor:   Nicolás Sheldon MD    Admitting diagnosis:   The primary encounter diagnosis was Hyperkalemia. A diagnosis of Nonintractable headache, unspecified chronicity pattern, unspecified headache type was also pertinent to this visit.    Code status:   Current Code Status       Date Active Code Status Order ID Comments User Context       Prior            Allergies:   Patient has no known  allergies.    Isolation:   No active isolations    Intake and Output  No intake or output data in the 24 hours ending 07/16/24 1236    Weight:       07/16/24  0937   Weight: 101 kg (222 lb)       Most recent vitals:   Vitals:    07/16/24 1137 07/16/24 1138 07/16/24 1139 07/16/24 1140   BP:       Pulse: 56 64 67 62   Resp:       Temp:       SpO2: 98% 98% 99% 98%   Weight:       Height:           Active LDAs/IV Access:   Lines, Drains & Airways       Active LDAs       Name Placement date Placement time Site Days    Peripheral IV 07/16/24 1025 Right Antecubital 07/16/24  1025  Antecubital  less than 1                    Labs (abnormal labs have a star):   Labs Reviewed   BASIC METABOLIC PANEL - Abnormal; Notable for the following components:       Result Value    Glucose 106 (*)     BUN 47 (*)     Creatinine 7.18 (*)     Potassium 5.9 (*)     Chloride 95 (*)     BUN/Creatinine Ratio 6.5 (*)     eGFR 7.8 (*)     All other components within normal limits    Narrative:     GFR Normal >60  Chronic Kidney Disease <60  Kidney Failure <15     CBC WITH AUTO DIFFERENTIAL - Abnormal; Notable for the following components:    RBC 3.81 (*)     Hemoglobin 12.0 (*)     Hematocrit 36.1 (*)     RDW 12.1 (*)     Lymphocyte % 17.3 (*)     All other components within normal limits   MAGNESIUM - Abnormal; Notable for the following components:    Magnesium 2.7 (*)     All other components within normal limits   PHOSPHORUS - Abnormal; Notable for the following components:    Phosphorus 4.6 (*)     All other components within normal limits   BASIC METABOLIC PANEL   POCT GLUCOSE FINGERSTICK   POCT GLUCOSE FINGERSTICK   POCT GLUCOSE FINGERSTICK   POCT GLUCOSE FINGERSTICK   CBC AND DIFFERENTIAL    Narrative:     The following orders were created for panel order CBC & Differential.  Procedure                               Abnormality         Status                     ---------                               -----------         ------                      CBC Auto Differential[[id number]]        Abnormal            Final result                 Please view results for these tests on the individual orders.       EKG:   ECG 12 Lead Electrolyte Imbalance   Final Result   HEART RATE=62  bpm   RR Ovfcbjcp=707  ms   AR Ueakncvm=030  ms   P Horizontal Axis=42  deg   P Front Axis=22  deg   QRSD Uuqgkpja=602  ms   QT Ygkqkpyz=557  ms   UElE=636  ms   QRS Axis=-62  deg   T Wave Axis=28  deg   - ABNORMAL ECG -   Sinus rhythm   Left anterior fascicular block   Abnormal R-wave progression, late transition   Minimal ST elevation, lateral leads   Borderline  prolonged QT interval   No change from previous tracing   Electronically Signed By: Reggie EstradaMAYLIN) ([facility]) 2024-07-16 12:30:33   Date and Time of Study:2024-07-16 11:16:42          Meds given in ED:   Medications   sodium chloride 0.9 % flush 10 mL (has no administration in time range)   prochlorperazine (COMPAZINE) injection 10 mg (10 mg Intravenous Given 7/16/24 1027)   diphenhydrAMINE (BENADRYL) injection 25 mg (25 mg Intravenous Given 7/16/24 1027)   insulin regular (humuLIN R,novoLIN R) injection 5 Units (5 Units Intravenous Given 7/16/24 1137)   dextrose (D50W) (25 g/50 mL) IV injection 25 g (25 g Intravenous Given 7/16/24 1137)       Imaging results:  CT Head Without Contrast    Result Date: 7/16/2024  There are calcified atherosclerotic plaques in the intracranial segments of the distal vertebral arteries and cavernous segments of the internal carotid arteries bilaterally. Otherwise this is a normal head CT. The etiology of this patient's headaches is not established on this exam.  Radiation dose reduction techniques were utilized, including automated exposure control and exposure modulation based on body size.        Ambulatory status:   - up ad shameka      Social issues:   Social History     Socioeconomic History    Marital status:    Tobacco Use    Smoking status: Never    Smokeless tobacco: Never    Vaping Use    Vaping status: Never Used   Substance and Sexual Activity    Alcohol use: No    Drug use: No    Sexual activity: Defer       Peripheral Neurovascular  Peripheral Neurovascular (Adult)  Peripheral Neurovascular WDL: WDL    Neuro Cognitive  Neuro Cognitive (Adult)  Cognitive/Neuro/Behavioral WDL: .WDL except  Headache Assessment  Headache Location: generalized  Severity Rating (0-10): 8  Description/Character: pressure  Associated Signs/Symptoms: fatigue, weakness  Pupils  Pupil PERRLA: yes  Lino Coma Scale  Best Eye Response: 4-->(E4) spontaneous  Best Motor Response: 6-->(M6) obeys commands  Best Verbal Response: 5-->(V5) oriented  Lino Coma Scale Score: 15  NIH Stroke Scale  Interval: baseline  1a. Level of Consciousness: 0-->Alert, keenly responsive  1b. LOC Questions: 0-->Answers both questions correctly  1c. LOC Commands: 0-->Performs both tasks correctly  2. Best Gaze: 0-->Normal  3. Visual: 0-->No visual loss  4. Facial Palsy: 0-->Normal symmetrical movements  5a. Motor Arm, Left: 0-->No drift, limb holds 90 (or 45) degrees for full 10 secs  5b. Motor Arm, Right: 0-->No drift, limb holds 90 (or 45) degrees for full 10 secs  6a. Motor Leg, Left: 0-->No drift, leg holds 30 degree position for full 5 secs  6b. Motor Leg, Right: 0-->No drift, leg holds 30 degree position for full 5 secs  7. Limb Ataxia: 0-->Absent  8. Sensory: 0-->Normal, no sensory loss  9. Best Language: 0-->No aphasia, normal  10. Dysarthria: 0-->Normal  11. Extinction and Inattention (formerly Neglect): 0-->No abnormality  Total (NIH Stroke Scale): 0    Learning  Learning Assessment (Adult)  Learning Readiness and Ability: language barrier identified    Respiratory  Respiratory WDL  Respiratory WDL: WDL    Abdominal Pain       Pain Assessments  Pain (Adult)  (0-10) Pain Rating: Rest: 0  Response to Pain Interventions: nonverbal indicators absent/decreased    NIH Stroke Scale  NIH Stroke Scale  Interval: baseline  1a.  Level of Consciousness: 0-->Alert, keenly responsive  1b. LOC Questions: 0-->Answers both questions correctly  1c. LOC Commands: 0-->Performs both tasks correctly  2. Best Gaze: 0-->Normal  3. Visual: 0-->No visual loss  4. Facial Palsy: 0-->Normal symmetrical movements  5a. Motor Arm, Left: 0-->No drift, limb holds 90 (or 45) degrees for full 10 secs  5b. Motor Arm, Right: 0-->No drift, limb holds 90 (or 45) degrees for full 10 secs  6a. Motor Leg, Left: 0-->No drift, leg holds 30 degree position for full 5 secs  6b. Motor Leg, Right: 0-->No drift, leg holds 30 degree position for full 5 secs  7. Limb Ataxia: 0-->Absent  8. Sensory: 0-->Normal, no sensory loss  9. Best Language: 0-->No aphasia, normal  10. Dysarthria: 0-->Normal  11. Extinction and Inattention (formerly Neglect): 0-->No abnormality  Total (NIH Stroke Scale): 0    Anson Bergeron RN  07/16/24 12:36 EDT     Electronically signed by Anson Tafoya RN at 07/16/24 1237       Elin Sun RN at 07/16/24 1232          Nursing report ED to floor  [facility]  68 y.o.  male    HPI :  HPI (Adult)  Stated Reason for Visit: headache    Chief Complaint  Chief Complaint   Patient presents with    Headache       Admitting doctor:   Nicolás Sheldon MD    Admitting diagnosis:   The primary encounter diagnosis was Hyperkalemia. A diagnosis of Nonintractable headache, unspecified chronicity pattern, unspecified headache type was also pertinent to this visit.    Code status:   Current Code Status       Date Active Code Status Order ID Comments User Context       Prior            Allergies:   Patient has no known allergies.    Isolation:   No active isolations    Intake and Output  No intake or output data in the 24 hours ending 07/16/24 1232    Weight:       07/16/24  0937   Weight: 101 kg (222 lb)       Most recent vitals:   Vitals:    07/16/24 1137 07/16/24 1138 07/16/24 1139 07/16/24 1140   BP:       Pulse: 56 64 67 62   Resp:       Temp:        SpO2: 98% 98% 99% 98%   Weight:       Height:           Active LDAs/IV Access:   Lines, Drains & Airways       Active LDAs       Name Placement date Placement time Site Days    Peripheral IV 07/16/24 1025 Right Antecubital 07/16/24  1025  Antecubital  less than 1                    Labs (abnormal labs have a star):   Labs Reviewed   BASIC METABOLIC PANEL - Abnormal; Notable for the following components:       Result Value    Glucose 106 (*)     BUN 47 (*)     Creatinine 7.18 (*)     Potassium 5.9 (*)     Chloride 95 (*)     BUN/Creatinine Ratio 6.5 (*)     eGFR 7.8 (*)     All other components within normal limits    Narrative:     GFR Normal >60  Chronic Kidney Disease <60  Kidney Failure <15     CBC WITH AUTO DIFFERENTIAL - Abnormal; Notable for the following components:    RBC 3.81 (*)     Hemoglobin 12.0 (*)     Hematocrit 36.1 (*)     RDW 12.1 (*)     Lymphocyte % 17.3 (*)     All other components within normal limits   BASIC METABOLIC PANEL   MAGNESIUM   PHOSPHORUS   POCT GLUCOSE FINGERSTICK   POCT GLUCOSE FINGERSTICK   POCT GLUCOSE FINGERSTICK   POCT GLUCOSE FINGERSTICK   CBC AND DIFFERENTIAL    Narrative:     The following orders were created for panel order CBC & Differential.  Procedure                               Abnormality         Status                     ---------                               -----------         ------                     CBC Auto Differential[[id number]]        Abnormal            Final result                 Please view results for these tests on the individual orders.       EKG:   ECG 12 Lead Electrolyte Imbalance   Final Result   HEART RATE=62  bpm   RR Oorcrqxv=464  ms   VT Gbfjyxcm=681  ms   P Horizontal Axis=42  deg   P Front Axis=22  deg   QRSD Nlfldpqo=740  ms   QT Oajuhjhh=498  ms   DTeH=384  ms   QRS Axis=-62  deg   T Wave Axis=28  deg   - ABNORMAL ECG -   Sinus rhythm   Left anterior fascicular block   Abnormal R-wave progression, late transition   Minimal ST  elevation, lateral leads   Borderline  prolonged QT interval   No change from previous tracing   Electronically Signed By: Reggie Estrada) ([facility]) 2024-07-16 12:30:33   Date and Time of Study:2024-07-16 11:16:42          Meds given in ED:   Medications   sodium chloride 0.9 % flush 10 mL (has no administration in time range)   prochlorperazine (COMPAZINE) injection 10 mg (10 mg Intravenous Given 7/16/24 1027)   diphenhydrAMINE (BENADRYL) injection 25 mg (25 mg Intravenous Given 7/16/24 1027)   insulin regular (humuLIN R,novoLIN R) injection 5 Units (5 Units Intravenous Given 7/16/24 1137)   dextrose (D50W) (25 g/50 mL) IV injection 25 g (25 g Intravenous Given 7/16/24 1137)       Imaging results:  CT Head Without Contrast    Result Date: 7/16/2024  There are calcified atherosclerotic plaques in the intracranial segments of the distal vertebral arteries and cavernous segments of the internal carotid arteries bilaterally. Otherwise this is a normal head CT. The etiology of this patient's headaches is not established on this exam.  Radiation dose reduction techniques were utilized, including automated exposure control and exposure modulation based on body size.        Ambulatory status:   - assist    Social issues:   Social History     Socioeconomic History    Marital status:    Tobacco Use    Smoking status: Never    Smokeless tobacco: Never   Vaping Use    Vaping status: Never Used   Substance and Sexual Activity    Alcohol use: No    Drug use: No    Sexual activity: Defer       Peripheral Neurovascular  Peripheral Neurovascular (Adult)  Peripheral Neurovascular WDL: WDL    Neuro Cognitive  Neuro Cognitive (Adult)  Cognitive/Neuro/Behavioral WDL: .WDL except  Headache Assessment  Headache Location: generalized  Severity Rating (0-10): 8  Description/Character: pressure  Associated Signs/Symptoms: fatigue, weakness  Pupils  Pupil PERRLA: yes  [place] Coma Scale  Best Eye Response: 4-->(E4) spontaneous  Best  Motor Response: 6-->(M6) obeys commands  Best Verbal Response: 5-->(V5) oriented  [place] Coma Scale Score: 15  NIH Stroke Scale  Interval: baseline  1a. Level of Consciousness: 0-->Alert, keenly responsive  1b. LOC Questions: 0-->Answers both questions correctly  1c. LOC Commands: 0-->Performs both tasks correctly  2. Best Gaze: 0-->Normal  3. Visual: 0-->No visual loss  4. Facial Palsy: 0-->Normal symmetrical movements  5a. Motor Arm, Left: 0-->No drift, limb holds 90 (or 45) degrees for full 10 secs  5b. Motor Arm, Right: 0-->No drift, limb holds 90 (or 45) degrees for full 10 secs  6a. Motor Leg, Left: 0-->No drift, leg holds 30 degree position for full 5 secs  6b. Motor Leg, Right: 0-->No drift, leg holds 30 degree position for full 5 secs  7. Limb Ataxia: 0-->Absent  8. Sensory: 0-->Normal, no sensory loss  9. Best Language: 0-->No aphasia, normal  10. Dysarthria: 0-->Normal  11. Extinction and Inattention (formerly Neglect): 0-->No abnormality  Total (NIH Stroke Scale): 0    Learning  Learning Assessment (Adult)  Learning Readiness and Ability: language barrier identified    Respiratory  Respiratory WDL  Respiratory WDL: WDL    Abdominal Pain       Pain Assessments  Pain (Adult)  (0-10) Pain Rating: Rest: 0  Response to Pain Interventions: nonverbal indicators absent/decreased    NIH Stroke Scale  NIH Stroke Scale  Interval: baseline  1a. Level of Consciousness: 0-->Alert, keenly responsive  1b. LOC Questions: 0-->Answers both questions correctly  1c. LOC Commands: 0-->Performs both tasks correctly  2. Best Gaze: 0-->Normal  3. Visual: 0-->No visual loss  4. Facial Palsy: 0-->Normal symmetrical movements  5a. Motor Arm, Left: 0-->No drift, limb holds 90 (or 45) degrees for full 10 secs  5b. Motor Arm, Right: 0-->No drift, limb holds 90 (or 45) degrees for full 10 secs  6a. Motor Leg, Left: 0-->No drift, leg holds 30 degree position for full 5 secs  6b. Motor Leg, Right: 0-->No drift, leg holds 30 degree  position for full 5 secs  7. Limb Ataxia: 0-->Absent  8. Sensory: 0-->Normal, no sensory loss  9. Best Language: 0-->No aphasia, normal  10. Dysarthria: 0-->Normal  11. Extinction and Inattention (formerly Neglect): 0-->No abnormality  Total (NIH Stroke Scale): 0    Elin Sun RN  07/16/24 12:32 EDT     Electronically signed by Elin Sun RN at 07/16/24 1232       Kianna Stephens RN at 07/16/24 1232          Nursing report ED to floor  Adryanjose alberto Olivarez  68 y.o.  male    HPI :  HPI (Adult)  Stated Reason for Visit: headache    Chief Complaint  Chief Complaint   Patient presents with    Headache       Admitting doctor:   Nicolás Sheldon MD    Admitting diagnosis:   The primary encounter diagnosis was Hyperkalemia. A diagnosis of Nonintractable headache, unspecified chronicity pattern, unspecified headache type was also pertinent to this visit.    Code status:   Current Code Status       Date Active Code Status Order ID Comments User Context       Prior            Allergies:   Patient has no known allergies.    Isolation:   No active isolations    Intake and Output  No intake or output data in the 24 hours ending 07/16/24 1232    Weight:       07/16/24  0937   Weight: 101 kg (222 lb)       Most recent vitals:   Vitals:    07/16/24 1137 07/16/24 1138 07/16/24 1139 07/16/24 1140   BP:       Pulse: 56 64 67 62   Resp:       Temp:       SpO2: 98% 98% 99% 98%   Weight:       Height:           Active LDAs/IV Access:   Lines, Drains & Airways       Active LDAs       Name Placement date Placement time Site Days    Peripheral IV 07/16/24 1025 Right Antecubital 07/16/24  1025  Antecubital  less than 1                    Labs (abnormal labs have a star):   Labs Reviewed   BASIC METABOLIC PANEL - Abnormal; Notable for the following components:       Result Value    Glucose 106 (*)     BUN 47 (*)     Creatinine 7.18 (*)     Potassium 5.9 (*)     Chloride 95 (*)     BUN/Creatinine Ratio 6.5 (*)     eGFR 7.8 (*)      All other components within normal limits    Narrative:     GFR Normal >60  Chronic Kidney Disease <60  Kidney Failure <15     CBC WITH AUTO DIFFERENTIAL - Abnormal; Notable for the following components:    RBC 3.81 (*)     Hemoglobin 12.0 (*)     Hematocrit 36.1 (*)     RDW 12.1 (*)     Lymphocyte % 17.3 (*)     All other components within normal limits   BASIC METABOLIC PANEL   MAGNESIUM   PHOSPHORUS   POCT GLUCOSE FINGERSTICK   POCT GLUCOSE FINGERSTICK   POCT GLUCOSE FINGERSTICK   POCT GLUCOSE FINGERSTICK   CBC AND DIFFERENTIAL    Narrative:     The following orders were created for panel order CBC & Differential.  Procedure                               Abnormality         Status                     ---------                               -----------         ------                     CBC Auto Differential[[id number]]        Abnormal            Final result                 Please view results for these tests on the individual orders.       EKG:   ECG 12 Lead Electrolyte Imbalance   Final Result   HEART RATE=62  bpm   RR Urvehwem=227  ms   DE Rerlndmb=942  ms   P Horizontal Axis=42  deg   P Front Axis=22  deg   QRSD Gqkoycvx=574  ms   QT Pgsxtxnc=537  ms   DWrU=182  ms   QRS Axis=-62  deg   T Wave Axis=28  deg   - ABNORMAL ECG -   Sinus rhythm   Left anterior fascicular block   Abnormal R-wave progression, late transition   Minimal ST elevation, lateral leads   Borderline  prolonged QT interval   No change from previous tracing   Electronically Signed By: Reggie Estrada) ([facility]) 2024-07-16 12:30:33   Date and Time of Study:2024-07-16 11:16:42          Meds given in ED:   Medications   sodium chloride 0.9 % flush 10 mL (has no administration in time range)   prochlorperazine (COMPAZINE) injection 10 mg (10 mg Intravenous Given 7/16/24 1027)   diphenhydrAMINE (BENADRYL) injection 25 mg (25 mg Intravenous Given 7/16/24 1027)   insulin regular (humuLIN R,novoLIN R) injection 5 Units (5 Units Intravenous Given  7/16/24 1137)   dextrose (D50W) (25 g/50 mL) IV injection 25 g (25 g Intravenous Given 7/16/24 1137)       Imaging results:  CT Head Without Contrast    Result Date: 7/16/2024  There are calcified atherosclerotic plaques in the intracranial segments of the distal vertebral arteries and cavernous segments of the internal carotid arteries bilaterally. Otherwise this is a normal head CT. The etiology of this patient's headaches is not established on this exam.  Radiation dose reduction techniques were utilized, including automated exposure control and exposure modulation based on body size.        Ambulatory status:   - assist    Social issues:   Social History     Socioeconomic History    Marital status:    Tobacco Use    Smoking status: Never    Smokeless tobacco: Never   Vaping Use    Vaping status: Never Used   Substance and Sexual Activity    Alcohol use: No    Drug use: No    Sexual activity: Defer       Peripheral Neurovascular  Peripheral Neurovascular (Adult)  Peripheral Neurovascular WDL: WDL    Neuro Cognitive  Neuro Cognitive (Adult)  Cognitive/Neuro/Behavioral WDL: .WDL except  Headache Assessment  Headache Location: generalized  Severity Rating (0-10): 8  Description/Character: pressure  Associated Signs/Symptoms: fatigue, weakness  Pupils  Pupil PERRLA: yes  Lino Coma Scale  Best Eye Response: 4-->(E4) spontaneous  Best Motor Response: 6-->(M6) obeys commands  Best Verbal Response: 5-->(V5) oriented  [place] Coma Scale Score: 15  NIH Stroke Scale  Interval: baseline  1a. Level of Consciousness: 0-->Alert, keenly responsive  1b. LOC Questions: 0-->Answers both questions correctly  1c. LOC Commands: 0-->Performs both tasks correctly  2. Best Gaze: 0-->Normal  3. Visual: 0-->No visual loss  4. Facial Palsy: 0-->Normal symmetrical movements  5a. Motor Arm, Left: 0-->No drift, limb holds 90 (or 45) degrees for full 10 secs  5b. Motor Arm, Right: 0-->No drift, limb holds 90 (or 45) degrees for full  10 secs  6a. Motor Leg, Left: 0-->No drift, leg holds 30 degree position for full 5 secs  6b. Motor Leg, Right: 0-->No drift, leg holds 30 degree position for full 5 secs  7. Limb Ataxia: 0-->Absent  8. Sensory: 0-->Normal, no sensory loss  9. Best Language: 0-->No aphasia, normal  10. Dysarthria: 0-->Normal  11. Extinction and Inattention (formerly Neglect): 0-->No abnormality  Total (NIH Stroke Scale): 0    Learning  Learning Assessment (Adult)  Learning Readiness and Ability: language barrier identified    Respiratory  Respiratory WDL  Respiratory WDL: WDL    Abdominal Pain       Pain Assessments  Pain (Adult)  (0-10) Pain Rating: Rest: 0  Response to Pain Interventions: nonverbal indicators absent/decreased    NIH Stroke Scale  NIH Stroke Scale  Interval: baseline  1a. Level of Consciousness: 0-->Alert, keenly responsive  1b. LOC Questions: 0-->Answers both questions correctly  1c. LOC Commands: 0-->Performs both tasks correctly  2. Best Gaze: 0-->Normal  3. Visual: 0-->No visual loss  4. Facial Palsy: 0-->Normal symmetrical movements  5a. Motor Arm, Left: 0-->No drift, limb holds 90 (or 45) degrees for full 10 secs  5b. Motor Arm, Right: 0-->No drift, limb holds 90 (or 45) degrees for full 10 secs  6a. Motor Leg, Left: 0-->No drift, leg holds 30 degree position for full 5 secs  6b. Motor Leg, Right: 0-->No drift, leg holds 30 degree position for full 5 secs  7. Limb Ataxia: 0-->Absent  8. Sensory: 0-->Normal, no sensory loss  9. Best Language: 0-->No aphasia, normal  10. Dysarthria: 0-->Normal  11. Extinction and Inattention (formerly Neglect): 0-->No abnormality  Total (NIH Stroke Scale): 0    Kianna Stephens RN  07/16/24 12:32 EDT      Electronically signed by Kianna Stephens RN at 07/16/24 5122       Fernando Dias II, MD at 07/16/24 1015        Procedure Orders    1. Critical Care [[id number]] ordered by Fernando Dias II, MD                  EMERGENCY DEPARTMENT ENCOUNTER    Room  Number:  15/15  PCP: Freda Hall APRN    HPI:  Chief Complaint: Headache  A complete HPI/ROS/PMH/PSH/SH/FH are unobtainable due to: None  Context: Adryan Olivarez is a 68 y.o. male who presents to the ED c/o acute headache.  She he complains of having a generalized headache to the top of the head that feels like stabbing pain.  This is intermittent pain that will come for about 2 hours at a time and occur on most days.  No fever.  No vision change.  Nothing makes this worse or better that he has noticed.  He gets dialysis Monday, Wednesday, Friday with last dialysis performed yesterday.        PAST MEDICAL HISTORY  Active Ambulatory Problems     Diagnosis Date Noted    MARGARITA (acute kidney injury) 02/24/2021    History of stroke 02/24/2021    HTN (hypertension) 02/24/2021    Anxiety disorder 02/24/2021    Hypertension 02/24/2021    ESRD on hemodialysis 02/27/2021    Uremia 07/13/2023    Anemia 07/14/2023    Noncompliance of patient with renal dialysis 07/14/2023     Resolved Ambulatory Problems     Diagnosis Date Noted    CKD (chronic kidney disease) stage 3, GFR 30-59 ml/min 02/24/2021    Hyperkalemia 02/24/2021     Past Medical History:   Diagnosis Date    Anxiety     Chronic kidney disease     Stroke 1989         PAST SURGICAL HISTORY  Past Surgical History:   Procedure Laterality Date    INSERTION HEMODIALYSIS CATHETER Right 2/27/2021    Procedure: TUNNELED DIAYLIS CATHETER PLACEMENT;  Surgeon: Fernando Bazzi MD;  Location: [facility];  Service: Vascular;  Laterality: Right;         FAMILY HISTORY  No family history on file.      SOCIAL HISTORY  Social History     Socioeconomic History    Marital status:    Tobacco Use    Smoking status: Never    Smokeless tobacco: Never   Vaping Use    Vaping status: Never Used   Substance and Sexual Activity    Alcohol use: No    Drug use: No    Sexual activity: Defer         ALLERGIES  Patient has no known allergies.        REVIEW OF SYSTEMS  Review of  Systems     Included in HPI  All systems reviewed and negative except for those discussed in HPI.       PHYSICAL EXAM  ED Triage Vitals   Temp Heart Rate Resp BP SpO2   07/16/24 0934 07/16/24 0934 07/16/24 0934 07/16/24 0938 07/16/24 0934   97 °F (36.1 °C) 82 16 157/74 97 %      Temp src Heart Rate Source Patient Position BP Location FiO2 (%)   -- -- -- -- --              Physical Exam      GENERAL: no acute distress  HENT: nares patent  EYES: no scleral icterus  CV: regular rhythm, normal rate, left upper extremity fistula  RESPIRATORY: normal effort  ABDOMEN: soft  MUSCULOSKELETAL: no deformity  NEURO:   Recent and remote memory functions are normal. The patient is attentive with normal concentration. Language is fluent. Speech is clear. The speech is non-dysarthric. Fund of knowledge is normal.   Symmetric smile with no facial droop.  Eyes close shut strongly bilaterally.  Symmetric eyebrow raise bilaterally.  EOMI, PERRL  CN II-XII grossly normal otherwise.  5/5 strength to extremities.  No pronator drift.  Intact FNF.  No meningismus.  PSYCH:  calm, cooperative  SKIN: warm, dry    Vital signs and nursing notes reviewed.          LAB RESULTS  Recent Results (from the past 24 hour(s))   Basic Metabolic Panel    Collection Time: 07/16/24 10:19 AM    Specimen: Blood   Result Value Ref Range    Glucose 106 (H) 65 - 99 mg/dL    BUN 47 (H) 8 - 23 mg/dL    Creatinine 7.18 (H) 0.76 - 1.27 mg/dL    Sodium 137 136 - 145 mmol/L    Potassium 5.9 (H) 3.5 - 5.2 mmol/L    Chloride 95 (L) 98 - 107 mmol/L    CO2 29.0 22.0 - 29.0 mmol/L    Calcium 8.6 8.6 - 10.5 mg/dL    BUN/Creatinine Ratio 6.5 (L) 7.0 - 25.0    Anion Gap 13.0 5.0 - 15.0 mmol/L    eGFR 7.8 (L) >60.0 mL/min/1.73   CBC Auto Differential    Collection Time: 07/16/24 10:19 AM    Specimen: Blood   Result Value Ref Range    WBC 8.22 3.40 - 10.80 10*3/mm3    RBC 3.81 (L) 4.14 - 5.80 10*6/mm3    Hemoglobin 12.0 (L) 13.0 - 17.7 g/dL    Hematocrit 36.1 (L) 37.5 - 51.0 %     MCV 94.8 79.0 - 97.0 fL    MCH 31.5 26.6 - 33.0 pg    MCHC 33.2 31.5 - 35.7 g/dL    RDW 12.1 (L) 12.3 - 15.4 %    RDW-SD 41.8 37.0 - 54.0 fl    MPV 9.2 6.0 - 12.0 fL    Platelets 208 140 - 450 10*3/mm3    Neutrophil % 68.4 42.7 - 76.0 %    Lymphocyte % 17.3 (L) 19.6 - 45.3 %    Monocyte % 10.3 5.0 - 12.0 %    Eosinophil % 3.4 0.3 - 6.2 %    Basophil % 0.5 0.0 - 1.5 %    Immature Grans % 0.1 0.0 - 0.5 %    Neutrophils, Absolute 5.62 1.70 - 7.00 10*3/mm3    Lymphocytes, Absolute 1.42 0.70 - 3.10 10*3/mm3    Monocytes, Absolute 0.85 0.10 - 0.90 10*3/mm3    Eosinophils, Absolute 0.28 0.00 - 0.40 10*3/mm3    Basophils, Absolute 0.04 0.00 - 0.20 10*3/mm3    Immature Grans, Absolute 0.01 0.00 - 0.05 10*3/mm3    nRBC 0.0 0.0 - 0.2 /100 WBC   ECG 12 Lead Electrolyte Imbalance    Collection Time: 07/16/24 11:16 AM   Result Value Ref Range    QT Interval 474 ms    QTC Interval 482 ms       Ordered the above labs and reviewed the results.        RADIOLOGY  CT Head Without Contrast    Result Date: 7/16/2024  EMERGENCY CT SCAN OF THE HEAD WITHOUT CONTRAST ON 07/16/2024  CLINICAL HISTORY: This is a 68-year-old male patient who has had a headache for 2 months.  TECHNIQUE: Spiral CT images were obtained from the base of the skull to the vertex without intravenous contrast. The images were reformatted and are submitted in 3 mm thick axial, sagittal and coronal CT sections with brain algorithm.  There are no prior head CTs or MRIs of the brain from [facility] for comparison.  FINDINGS: The brain parenchyma is normal in attenuation. The ventricles are normal in size. I see no focal mass effect. There is no midline shift. No extra-axial fluid collections are identified. There is no evidence of acute intracranial hemorrhage. The calvarium and the skull base are normal in appearance. The paranasal sinuses and the mastoid air cells and the middle ear cavities are clear. The orbits are normal in appearance. There is some  calcified atherosclerotic plaques in the intracranial segments of the distal vertebral arteries and the cavernous segments of the internal carotid arteries bilaterally.      There are calcified atherosclerotic plaques in the intracranial segments of the distal vertebral arteries and cavernous segments of the internal carotid arteries bilaterally. Otherwise this is a normal head CT. The etiology of this patient's headaches is not established on this exam.  Radiation dose reduction techniques were utilized, including automated exposure control and exposure modulation based on body size.        Ordered the above noted radiological studies. Reviewed by me in PACS.        MEDICATIONS GIVEN IN ER  Medications   sodium chloride 0.9 % flush 10 mL (has no administration in time range)   prochlorperazine (COMPAZINE) injection 10 mg (10 mg Intravenous Given 7/16/24 1027)   diphenhydrAMINE (BENADRYL) injection 25 mg (25 mg Intravenous Given 7/16/24 1027)   insulin regular (humuLIN R,novoLIN R) injection 5 Units (5 Units Intravenous Given 7/16/24 1137)   dextrose (D50W) (25 g/50 mL) IV injection 25 g (25 g Intravenous Given 7/16/24 1137)         ORDERS PLACED DURING THIS VISIT:  Orders Placed This Encounter   Procedures    Critical Care    CT Head Without Contrast    Basic Metabolic Panel    CBC Auto Differential    Basic Metabolic Panel    Magnesium    Phosphorus    Nephrology (on -call MD unless specified)    [facility] (on-call MD unless specified) Details    POC Glucose Q1H    ECG 12 Lead Electrolyte Imbalance    Insert Peripheral IV    Hemodialysis Inpatient    Initiate Observation Status    CBC & Differential         OUTPATIENT MEDICATION MANAGEMENT:  Current Facility-Administered Medications Ordered in Epic   Medication Dose Route Frequency Provider Last Rate Last Admin    sodium chloride 0.9 % flush 10 mL  10 mL Intravenous Fernando Lorenzana II, MD         Current Outpatient Medications Ordered in Epic   Medication Sig  Dispense Refill    Baclofen (LIORESAL) 5 MG tablet Take 1 tablet by mouth Daily.      calcium acetate (PHOS BINDER,) 667 MG capsule capsule Take 3 capsules by mouth 3 (Three) Times a Day.      hydrALAZINE (APRESOLINE) 50 MG tablet Take 1 tablet by mouth 3 (Three) Times a Day. 90 tablet 1    sennosides-docusate (Senna Plus) 8.6-50 MG per tablet Take 2 tablets by mouth Daily.      amLODIPine (NORVASC) 10 MG tablet Take 1 tablet by mouth Daily.      atorvastatin (LIPITOR) 20 MG tablet Take 1 tablet by mouth Daily.      Ergocalciferol (VITAMIN D2 PO) Take 1.25 mg by mouth.      linaclotide (LINZESS) 290 MCG capsule capsule Take 1 capsule by mouth Every Morning Before Breakfast.      Loratadine 10 MG capsule Take 1 capsule by mouth 1 (One) Time.      losartan (COZAAR) 50 MG tablet Take 1 tablet by mouth Daily. 30 tablet 1    PARoxetine (PAXIL) 40 MG tablet Take 1 tablet by mouth Every Morning.      sodium bicarbonate 650 MG tablet Take 1 tablet by mouth 4 (Four) Times a Day. 120 tablet 0    tamsulosin (FLOMAX) 0.4 MG capsule 24 hr capsule Take 1 capsule by mouth Every Night.         PROCEDURES  Critical Care    Performed by: Fernando Dias II, MD  Authorized by: Fernando Dias II, MD    Critical care provider statement:     Critical care time (minutes):  32    Critical care was necessary to treat or prevent imminent or life-threatening deterioration of the following conditions:  Metabolic crisis    Critical care was time spent personally by me on the following activities:  Ordering and performing treatments and interventions, ordering and review of laboratory studies, ordering and review of radiographic studies, pulse oximetry, re-evaluation of patient's condition, discussions with consultants, evaluation of patient's response to treatment, examination of patient and obtaining history from patient or surrogate            MEDICAL DECISION MAKING, PROGRESS, and CONSULTS    Discussion below represents my  analysis of pertinent findings related to patient's condition, differential diagnosis, treatment plan and final disposition.      Additional sources:  - Discussed/obtained information from independent historians: Son at bedside  Additional information was obtained to confirm the patient's history.      Differential diagnosis:    Differential diagnosis for headache includes but is not limited to:  - subarachnoid hemorrhage  - intracranial mass  - stroke  - RCVS  - meningitis  - glaucoma  - giant cell arteritis  - CO poisoning  - cerebral venous sinus thrombosis  - migraine             Independent interpretation of labs, radiology studies, and discussions with consultants:  ED Course as of 07/16/24 1212   Tue Jul 16, 2024   1103 Creatinine(!): 7.18 [TD]   1103 Potassium(!): 5.9 [TD]   1121 EKG independently interpreted by myself.  Time 11:16 AM.  Sinus rhythm.  Heart rate 62.  LAFB.  Peaked T waves in the precordial leads. [TD]   1150 I discussed the case with Dr. Berg, nephrology.  He recommends admission with plan for dialysis. [TD]   1210 I discussed the case with Dr. Rose, hospitalist.  He will admit. [TD]      ED Course User Index  [TD] Fernando Dias II, MD             DIAGNOSIS  Final diagnoses:   Hyperkalemia   Nonintractable headache, unspecified chronicity pattern, unspecified headache type         DISPOSITION  Admit      Latest Documented Vital Signs:  As of 12:12 EDT  BP- 173/83 HR- 62 Temp- 97 °F (36.1 °C) O2 sat- 98%      --    Please note that portions of this were completed with a voice recognition program.       Note Disclaimer: At [facility], we believe that sharing information builds trust and better relationships. You are receiving this note because you are receiving care at [facility] or recently visited. It is possible you will see health information before a provider has talked with you about it. This kind of information can be easy to misunderstand. To help you fully  understand what it means for your health, we urge you to discuss this note with your provider.         Fernando Dias II, MD  07/16/24 1212      Electronically signed by Fernando Dias II, MD at 07/16/24 1212       Fanny Olvera, RN at 07/16/24 0933          Patient to ER via car from home for headache x 3 days    Patient had dialysis yesterday as normal schedule     Electronically signed by Fanny Olvera, RN at 07/16/24 0934       Vital Signs (last day)       Date/Time Temp Temp src Pulse Resp BP Patient Position SpO2    07/17/24 1130 97.3 (36.3) Oral 63 18 129/57 Lying 97    07/17/24 0718 98.2 (36.8) Oral 73 18 166/78 Lying 97    07/16/24 2331 97.7 (36.5) Oral 74 16 128/68 Lying 99    07/16/24 2113 97.5 (36.4) Oral 61 16 138/67 Lying 100    07/16/24 1716 97.3 (36.3) Oral 66 16 141/81 Lying 100    07/16/24 1640 97.3 (36.3) -- 73 18 162/81 -- --    07/16/24 1255 97.1 (36.2) -- 62 18 169/72 -- --    07/16/24 1140 -- -- 62 -- -- -- 98    07/16/24 1139 -- -- 67 -- -- -- 99    07/16/24 1138 -- -- 64 -- -- -- 98    07/16/24 1137 -- -- 56 -- -- -- 98    07/16/24 1136 -- -- 64 -- -- -- 97    07/16/24 1135 -- -- 63 -- -- -- 97    07/16/24 1134 -- -- 62 -- -- -- 96    07/16/24 1133 -- -- 61 -- -- -- 99    07/16/24 1132 -- -- 62 -- -- -- 97    07/16/24 1131 -- -- 58 -- -- -- 97    07/16/24 1130 -- -- 64 -- -- -- 98    07/16/24 1129 -- -- 61 -- -- -- 99    07/16/24 1128 -- -- 63 -- -- -- 96    07/16/24 1127 -- -- 59 -- -- -- 97 07/16/24 1126 -- -- 60 -- -- -- 97 07/16/24 1125 -- -- 62 -- -- -- 96 07/16/24 1124 -- -- 63 -- -- -- 96    07/16/24 1123 -- -- 63 -- -- -- 99    07/16/24 1122 -- -- 56 -- -- -- 98    07/16/24 1121 -- -- 64 -- -- -- 98    07/16/24 1120 -- -- 62 -- -- -- 99    07/16/24 1105 -- -- 65 -- -- -- 96 07/16/24 1104 -- -- 65 -- -- -- 96    07/16/24 1103 -- -- 61 -- -- -- 98 07/16/24 1102 -- -- 68 -- -- -- 94    07/16/24 1101 -- -- 65 -- 173/83 -- 98    07/16/24 1100 -- -- 65 -- --  -- 97    07/16/24 1059 -- -- 64 -- -- -- 98    07/16/24 1058 -- -- 62 -- -- -- 97    07/16/24 1057 -- -- 67 -- -- -- 96    07/16/24 1056 -- -- 65 -- -- -- 98    07/16/24 1036 -- -- 68 -- -- -- 98    07/16/24 1035 -- -- 64 -- -- -- 97    07/16/24 1010 -- -- 70 -- -- -- 98    07/16/24 1009 -- -- 72 -- -- -- 98    07/16/24 1008 -- -- 68 -- -- -- 99    07/16/24 1007 -- -- 72 -- -- -- 100    07/16/24 1001 -- -- -- -- 152/77 -- --    07/16/24 0938 -- -- -- -- 157/74 -- --    07/16/24 0934 97 (36.1) -- 82 16 -- -- 97          Oxygen Therapy (last day)       Date/Time SpO2 Device (Oxygen Therapy) Flow (L/min) Oxygen Concentration (%) ETCO2 (mmHg)    07/17/24 1130 97 room air -- -- --    07/17/24 0843 -- room air -- -- --    07/17/24 0718 97 room air -- -- --    07/17/24 0210 -- room air -- -- --    07/16/24 2331 99 -- -- -- --    07/16/24 2113 100 room air -- -- --    07/16/24 2050 -- room air -- -- --    07/16/24 1716 100 -- -- -- --    07/16/24 1705 -- room air -- -- --    07/16/24 1140 98 -- -- -- --    07/16/24 1139 99 -- -- -- --    07/16/24 1138 98 -- -- -- --    07/16/24 1137 98 -- -- -- --    07/16/24 1136 97 -- -- -- --    07/16/24 1135 97 -- -- -- --    07/16/24 1134 96 -- -- -- --    07/16/24 1133 99 -- -- -- --    07/16/24 1132 97 -- -- -- --    07/16/24 1131 97 -- -- -- --    07/16/24 1130 98 -- -- -- --    07/16/24 1129 99 -- -- -- --    07/16/24 1128 96 -- -- -- --    07/16/24 1127 97 -- -- -- --    07/16/24 1126 97 -- -- -- --    07/16/24 1125 96 -- -- -- --    07/16/24 1124 96 -- -- -- --    07/16/24 1123 99 -- -- -- --    07/16/24 1122 98 -- -- -- --    07/16/24 1121 98 -- -- -- --    07/16/24 1120 99 -- -- -- --    07/16/24 1105 96 -- -- -- --    07/16/24 1104 96 -- -- -- --    07/16/24 1103 98 -- -- -- --    07/16/24 1102 94 -- -- -- --    07/16/24 1101 98 -- -- -- --    07/16/24 1100 97 -- -- -- --    07/16/24 1059 98 -- -- -- --    07/16/24 1058 97 -- -- -- --    07/16/24 1057 96 -- -- -- --     07/16/24 1056 98 -- -- -- --    07/16/24 1036 98 -- -- -- --    07/16/24 1035 97 -- -- -- --    07/16/24 1010 98 -- -- -- --    07/16/24 1009 98 -- -- -- --    07/16/24 1008 99 -- -- -- --    07/16/24 1007 100 -- -- -- --    07/16/24 0934 97 -- -- -- --          Facility-Administered Medications as of 7/17/2024   Medication Dose Route Frequency Provider Last Rate Last Admin    acetaminophen (TYLENOL) tablet 650 mg  650 mg Oral Q4H PRN Nicolás Sheldon MD        Or    acetaminophen (TYLENOL) 160 MG/5ML oral solution 650 mg  650 mg Oral Q4H PRN Nicolás Sheldon MD        Or    acetaminophen (TYLENOL) suppository 650 mg  650 mg Rectal Q4H PRN Nicolás Sheldon MD        baclofen (LIORESAL) tablet 5 mg  5 mg Oral Daily PRN Nicolás Sheldon MD        sennosides-docusate (PERICOLACE) 8.6-50 MG per tablet 2 tablet  2 tablet Oral BID PRN Nicolás Sheldon MD        And    polyethylene glycol (MIRALAX) packet 17 g  17 g Oral Daily PRN Nicloás Sheldon MD        And    bisacodyl (DULCOLAX) EC tablet 5 mg  5 mg Oral Daily PRN Nicolás Sheldon MD        And    bisacodyl (DULCOLAX) suppository 10 mg  10 mg Rectal Daily PRN Nicolás Sheldon MD        calcium acetate (PHOS BINDER)) capsule 1,334 mg  1,334 mg Oral TID With Meals Win Berg MD   1,334 mg at 07/17/24 0843    [COMPLETED] dextrose (D50W) (25 g/50 mL) IV injection 25 g  25 g Intravenous Once Fernando Dias II, MD   25 g at 07/16/24 1137    [COMPLETED] diphenhydrAMINE (BENADRYL) injection 25 mg  25 mg Intravenous Once Fernando Dias II, MD   25 mg at 07/16/24 1027    heparin (porcine) 5000 UNIT/ML injection 5,000 Units  5,000 Units Subcutaneous Q12H Nicolás Sheldon MD   5,000 Units at 07/17/24 0843    hydrALAZINE (APRESOLINE) tablet 100 mg  100 mg Oral TID Juan Parra MD        [COMPLETED] insulin regular (humuLIN R,novoLIN R) injection 5 Units  5 Units Intravenous Once Fernando Dias II, MD   5 Units  at 07/16/24 1137    nitroglycerin (NITROSTAT) SL tablet 0.4 mg  0.4 mg Sublingual Q5 Min PRN Nicolás Sheldon MD        ondansetron ODT (ZOFRAN-ODT) disintegrating tablet 4 mg  4 mg Oral Q6H PRN Nicolás Sheldon MD        Or    ondansetron (ZOFRAN) injection 4 mg  4 mg Intravenous Q6H PRN Nicolás Sheldon MD        [COMPLETED] prochlorperazine (COMPAZINE) injection 10 mg  10 mg Intravenous Once Fernando Dias II, MD   10 mg at 07/16/24 1027    sodium chloride 0.9 % flush 10 mL  10 mL Intravenous PRN Fernando Dias II, MD        sodium chloride 0.9 % flush 10 mL  10 mL Intravenous Q12H Nicolás Sheldno MD   10 mL at 07/17/24 0843    sodium chloride 0.9 % flush 10 mL  10 mL Intravenous PRN Nicolás Sheldon MD        sodium chloride 0.9 % infusion 40 mL  40 mL Intravenous PRN Nicolás Sheldon MD         Orders (all)        Start     Ordered    07/18/24 0600  Basic Metabolic Panel  Morning Draw         07/17/24 1201    07/18/24 0600  Renal Function Panel  Morning Draw         07/17/24 1204    07/18/24 0600  Magnesium  Morning Draw         07/17/24 1204    07/18/24 0600  CBC (No Diff)  Morning Draw         07/17/24 1204    07/18/24 0000  MRI Brain With & Without Contrast  1 Time Imaging,   Status:  Canceled         07/16/24 2222    07/17/24 1600  hydrALAZINE (APRESOLINE) tablet 100 mg  3 Times Daily         07/17/24 0900    07/17/24 1201  ECG 12 Lead Rhythm Change  Once         07/17/24 1200    07/17/24 1201  Code Status and Medical Interventions:  Continuous         07/17/24 1200    07/17/24 1201  Hepatitis B Surface Antigen  Once         07/17/24 1201    07/17/24 1201  Hemodialysis Inpatient  Once        Comments: 2000 unit heparin bolus    07/17/24 1201    07/17/24 1046  MRI Brain With & Without Contrast  1 Time Imaging        Comments: To be done before dialysis today    07/17/24 1045    07/17/24 1031  MRI Brain With & Without Contrast  1 Time Imaging,   Status:  Canceled         Comments: To be done before dialysis today    07/17/24 1030    07/17/24 0954  Hepatitis B Surface Antigen  STAT         07/17/24 0953    07/17/24 0859  Hemodialysis Inpatient  Once,   Status:  Canceled        Comments: 2000 unit heparin bolus    07/17/24 0859    07/17/24 0600  CBC (No Diff)  Morning Draw         07/16/24 1508    07/17/24 0600  Comprehensive Metabolic Panel  Morning Draw         07/16/24 1508    07/17/24 0600  Magnesium  Morning Draw         07/16/24 1508    07/17/24 0600  Phosphorus  Morning Draw         07/16/24 1508    07/16/24 2100  sodium chloride 0.9 % flush 10 mL  Every 12 Hours Scheduled         07/16/24 1508    07/16/24 2100  heparin (porcine) 5000 UNIT/ML injection 5,000 Units  Every 12 Hours Scheduled         07/16/24 1508    07/16/24 1900  hydrALAZINE (APRESOLINE) tablet 50 mg  3 Times Daily,   Status:  Discontinued         07/16/24 1507    07/16/24 1800  Oral Care  2 Times Daily       07/16/24 1508    07/16/24 1800  calcium acetate (PHOS BINDER)) capsule 1,334 mg  3 Times Daily With Meals         07/16/24 1536    07/16/24 1732  Inpatient Case Management  Consult  Once        Provider:  (Not yet assigned)    07/16/24 1733    07/16/24 1727  Inpatient Consult to Advance Care Planning  Once        Provider:  (Not yet assigned)    07/16/24 1727    07/16/24 1600  Vital Signs  Every 4 Hours       07/16/24 1508    07/16/24 1517  Inpatient Nephrology Consult  Once        Specialty:  Nephrology  Provider:  Win Berg MD    07/16/24 1516    07/16/24 1515  Hemodialysis Inpatient  STAT        Comments: Heparin 2000 Unit IV bolus   Albumin 12.5 g every hour as needed for systolic blood pressure less than 100    If systolic blood pressure drops below 100 decrease ultrafiltration goal by 500 mLs  If within 30 minutes systolic blood pressure remains below 100 decrease by another 500mL    07/16/24 1514    07/16/24 1508  ondansetron ODT (ZOFRAN-ODT) disintegrating tablet 4 mg   "Every 6 Hours PRN        Placed in \"Or\" Linked Group    07/16/24 1508    07/16/24 1508  ondansetron (ZOFRAN) injection 4 mg  Every 6 Hours PRN        Placed in \"Or\" Linked Group    07/16/24 1508    07/16/24 1508  sennosides-docusate (PERICOLACE) 8.6-50 MG per tablet 2 tablet  2 Times Daily PRN        Placed in \"And\" Linked Group    07/16/24 1508    07/16/24 1508  polyethylene glycol (MIRALAX) packet 17 g  Daily PRN        Placed in \"And\" Linked Group    07/16/24 1508    07/16/24 1508  bisacodyl (DULCOLAX) EC tablet 5 mg  Daily PRN        Placed in \"And\" Linked Group    07/16/24 1508    07/16/24 1508  bisacodyl (DULCOLAX) suppository 10 mg  Daily PRN        Placed in \"And\" Linked Group    07/16/24 1508    07/16/24 1508  acetaminophen (TYLENOL) tablet 650 mg  Every 4 Hours PRN        Placed in \"Or\" Linked Group    07/16/24 1508    07/16/24 1508  acetaminophen (TYLENOL) 160 MG/5ML oral solution 650 mg  Every 4 Hours PRN        Placed in \"Or\" Linked Group    07/16/24 1508    07/16/24 1508  acetaminophen (TYLENOL) suppository 650 mg  Every 4 Hours PRN        Placed in \"Or\" Linked Group    07/16/24 1508    07/16/24 1508  Intake & Output  Every Shift       07/16/24 1508    07/16/24 1508  Weigh Patient  Once         07/16/24 1508    07/16/24 1508  Insert Peripheral IV  Once         07/16/24 1508    07/16/24 1508  Saline Lock & Maintain IV Access  Continuous,   Status:  Canceled         07/16/24 1508    07/16/24 1508  Continuous Cardiac Monitoring  Continuous        Comments: Follow Standing Orders As Outlined in Process Instructions (Open Order Report to View Full Instructions)    07/16/24 1508    07/16/24 1508  Maintain IV Access  Continuous         07/16/24 1508    07/16/24 1508  Telemetry - Place Orders & Notify Provider of Results When Patient Experiences Acute Chest Pain, Dysrhythmia or Respiratory Distress  Continuous        Comments: Open Order Report to View Parameters Requiring Provider Notification    07/16/24 " 1508    07/16/24 1508  May Be Off Telemetry for Tests  Continuous         07/16/24 1508    07/16/24 1508  Diet: Renal; Low Sodium (2-3g), Low Potassium, Low Phosphorus; Fluid Consistency: Thin (IDDSI 0)  Diet Effective Now         07/16/24 1508    07/16/24 1507  nitroglycerin (NITROSTAT) SL tablet 0.4 mg  Every 5 Minutes PRN         07/16/24 1508    07/16/24 1507  sodium chloride 0.9 % flush 10 mL  As Needed         07/16/24 1508    07/16/24 1507  sodium chloride 0.9 % infusion 40 mL  As Needed         07/16/24 1508    07/16/24 1507  baclofen (LIORESAL) tablet 5 mg  Daily PRN         07/16/24 1507    07/16/24 1352  Inpatient Neurology Consult General  Once        Specialty:  Neurology  Provider:  Seven Thompson MD    07/16/24 1351    07/16/24 1330  Basic Metabolic Panel  Once in 2 hours,   Status:  Canceled        Comments: Collect 2 hours after hyperkalemia meds administered.      07/16/24 1129    07/16/24 1213  Cardiac Monitoring  Continuous,   Status:  Canceled        Comments: Follow Standing Orders As Outlined in Process Instructions (Open Order Report to View Full Instructions)    07/16/24 1212    07/16/24 1213  Critical Care  Once        Comments: This order was created via procedure documentation    07/16/24 1212    07/16/24 1212  Initiate Observation Status  Once         07/16/24 1212    07/16/24 1205  Magnesium  Once         07/16/24 1204    07/16/24 1205  Phosphorus  Once         07/16/24 1204    07/16/24 1200  POC Glucose Q1H  Every Hour      Comments: POC Glucose - 30 Minutes After Insulin Administration & Then Q1H x3      07/16/24 1129    07/16/24 1151  [facility] (on-call MD unless specified) Details  Once        Specialty:  Hospitalist  Provider:  Nicolás Sheldon MD    07/16/24 1150    07/16/24 1150  Hemodialysis Inpatient  STAT,   Status:  Canceled        Comments: Heparin 2000 Unit IV bolus   Albumin 12.5 g every hour as needed for systolic blood pressure less than 100    If systolic blood  "pressure drops below 100 decrease ultrafiltration goal by 500 mLs  If within 30 minutes systolic blood pressure remains below 100 decrease by another 500mL    24 1150    24 1145  insulin regular (humuLIN R,novoLIN R) injection 5 Units  Once         24 1129    24 1145  dextrose (D50W) (25 g/50 mL) IV injection 25 g  Once         24 1129    24 1123  Nephrology (on -call MD unless specified)  Once        Specialty:  Nephrology  Provider:  Win Berg MD    24 1123    24 1104  ECG 12 Lead Electrolyte Imbalance  Once         24 1103    24 1033  prochlorperazine (COMPAZINE) injection 10 mg  Once         24 1017    24 1033  diphenhydrAMINE (BENADRYL) injection 25 mg  Once         24 1017    24 1018  CT Head Without Contrast  1 Time Imaging         24 1017    24 0940  Insert Peripheral IV  Once        Placed in \"And\" Linked Group    24 0939    24 0940  CBC & Differential  Once         24 0939    24 0940  Basic Metabolic Panel  Once         24 0939    24 0940  CBC Auto Differential  PROCEDURE ONCE         24 0939    24 0939  sodium chloride 0.9 % flush 10 mL  As Needed        Placed in \"And\" Linked Group    24 0939    24 0000  Telemetry Scan  Once         24 0000    24 0000  Telemetry Scan  Once         24 0000    24 0000  Telemetry Scan  Once         24 0000    24 0000  Telemetry Scan  Once         24 0000    --  Baclofen (LIORESAL) 5 MG tablet  Daily         24 1209    --  calcium acetate (PHOS BINDER,) 667 MG capsule capsule  3 Times Daily         24 1209    --  sennosides-docusate (Senna Plus) 8.6-50 MG per tablet  Daily         24 1209                     Physician Progress Notes (all)        Nicolás Sheldon MD at 24 1158              Name: Adryan Olivarez ADMIT: 2024   : 1955  " PCP: Freda Hall APRN    MRN: [id number] LOS: 0 days   AGE/SEX: 68 y.o. male  ROOM: 15/1     Subjective   Subjective   Chief Complaint   Patient presents with    Headache     Not reporting any chest pain palpitations or shortness of breath.  His headache has resolved today.    Objective   Objective   Vital Signs  Temp:  [97.1 °F (36.2 °C)-98.2 °F (36.8 °C)] 97.3 °F (36.3 °C)  Heart Rate:  [61-74] 63  Resp:  [16-18] 18  BP: (128-169)/(57-81) 129/57  SpO2:  [97 %-100 %] 97 %  on   ;   Device (Oxygen Therapy): room air  Body mass index is 31.85 kg/m².    Physical Exam  Vitals and nursing note reviewed.   Constitutional:       General: He is not in acute distress.     Appearance: He is not diaphoretic.   Cardiovascular:      Rate and Rhythm: Normal rate and regular rhythm.   Pulmonary:      Effort: Pulmonary effort is normal.      Breath sounds: No wheezing.   Abdominal:      Palpations: Abdomen is soft.      Tenderness: There is no abdominal tenderness. There is no guarding or rebound.   Musculoskeletal:         General: No tenderness.   Skin:     General: Skin is warm and dry.   Neurological:      Mental Status: He is alert. Mental status is at baseline.   Psychiatric:         Mood and Affect: Mood normal.         Behavior: Behavior normal.       Results Review  I reviewed the patient's new clinical results.    Results from last 7 days   Lab Units 07/17/24  0513 07/16/24  1019   WBC 10*3/mm3 8.13 8.22   HEMOGLOBIN g/dL 12.4* 12.0*   PLATELETS 10*3/mm3 209 208     Results from last 7 days   Lab Units 07/17/24  0513 07/16/24  1019   SODIUM mmol/L 137 137   POTASSIUM mmol/L 5.0 5.9*   CHLORIDE mmol/L 96* 95*   CO2 mmol/L 27.3 29.0   BUN mg/dL 31* 47*   CREATININE mg/dL 5.32* 7.18*   GLUCOSE mg/dL 83 106*   EGFR mL/min/1.73 11.2* 7.8*     Results from last 7 days   Lab Units 07/17/24  0513   ALBUMIN g/dL 3.8   BILIRUBIN mg/dL 0.3   ALK PHOS U/L 88   AST (SGOT) U/L 11   ALT (SGPT) U/L 14     Results from last 7  "days   Lab Units 07/17/24  0513 07/16/24  1019   CALCIUM mg/dL 8.4* 8.6   ALBUMIN g/dL 3.8  --    MAGNESIUM mg/dL 2.6* 2.7*   PHOSPHORUS mg/dL 5.1* 4.6*         No results found for: \"HGBA1C\", \"POCGLU\"    CT Head Without Contrast    Result Date: 7/16/2024   1. There are calcified atherosclerotic plaques in the intracranial segments of the distal vertebral arteries and cavernous segments of the internal carotid arteries bilaterally. Otherwise, this is a normal head CT. The etiology of this patient's headaches is not established on this exam.  Radiation dose reduction techniques were utilized, including automated exposure control and exposure modulation based on body size.   This report was finalized on 7/16/2024 12:59 PM by Dr. Maximo Gonzalez M.D on Workstation: [id number]       I have personally reviewed all medications:  Scheduled Medications  calcium acetate, 1,334 mg, Oral, TID With Meals  heparin (porcine), 5,000 Units, Subcutaneous, Q12H  hydrALAZINE, 100 mg, Oral, TID  sodium chloride, 10 mL, Intravenous, Q12H      Infusions     Diet  Diet: Renal; Low Sodium (2-3g), Low Potassium, Low Phosphorus; Fluid Consistency: Thin (IDDSI 0)    I have personally reviewed:  [x]  Laboratory   []  Microbiology   []  Radiology   [x]  EKG/Telemetry  []  Cardiology/Vascular   []  Pathology    []  Records      Assessment/Plan     Active Hospital Problems    Diagnosis  POA    **Hyperkalemia [E87.5]  Yes    ESRD on hemodialysis [N18.6, Z99.2]  Not Applicable    HTN (hypertension) [I10]  Yes      Resolved Hospital Problems   No resolved problems to display.       68 y.o. male admitted with Hyperkalemia.    ESRD with hyperkalemia: Potassium improved after dialysis.  Nephrology following.  Hypertension: Have resumed home regimen.  Monitoring.  Bigeminy: Had bigeminy last night on telemetry.  Did not see any heart block on the previous EKG.  Will repeat EKG to monitor.  He had a normal rate on exam today.  Headache: Symptoms are " "improved today per patient report.  Neurology following.  PPX: Heparin  Disposition: Home/possibly tomorrow    Expected Discharge Date: 7/18/2024; Expected Discharge Time:      Nicolás Sheldon MD  [facility]  07/17/24  11:59 EDT    Dictated portions of note using Dragon dictation software.  Copied text in this note has been reviewed by me and remains accurate as of 07/17/24    Electronically signed by Nicolás Sheldon MD at 07/17/24 1204       Juan Parra MD at 07/17/24 0857          RENAL/KCC:     LOS: 0 days    Patient Care Team:  Freda Hall APRN as PCP - General (Family Medicine)    Chief Complaint:  ESRD    Subjective     Interval History:   Chart reviewed    Objective     Vital Sign Min/Max for last 24 hours  Temp  Min: 97 °F (36.1 °C)  Max: 98.2 °F (36.8 °C)   BP  Min: 128/68  Max: 173/83   Pulse  Min: 56  Max: 82   Resp  Min: 16  Max: 18   SpO2  Min: 94 %  Max: 100 %   No data recorded   Weight  Min: 101 kg (222 lb)  Max: 101 kg (222 lb)     Flowsheet Rows      Flowsheet Row First Filed Value   Admission Height 177.8 cm (70\") Documented at 07/16/2024 0937   Admission Weight 101 kg (222 lb) Documented at 07/16/2024 0937            No intake/output data recorded.  I/O last 3 completed shifts:  In: -   Out: 2000     Physical Exam:  GEN: Awake, NAD  ENT: PERRL, EOMI, MMM  NECK: Supple, no JVD  CHEST: CTAB, no W/R/C  CV: RRR, no M/G/R  ABD: Soft, NT, +BS  SKIN: Warm and Dry  NEURO: CN's intact      WBC WBC   Date Value Ref Range Status   07/17/2024 8.13 3.40 - 10.80 10*3/mm3 Final   07/16/2024 8.22 3.40 - 10.80 10*3/mm3 Final      HGB Hemoglobin   Date Value Ref Range Status   07/17/2024 12.4 (L) 13.0 - 17.7 g/dL Final   07/16/2024 12.0 (L) 13.0 - 17.7 g/dL Final      HCT Hematocrit   Date Value Ref Range Status   07/17/2024 35.8 (L) 37.5 - 51.0 % Final   07/16/2024 36.1 (L) 37.5 - 51.0 % Final      Platlets No results found for: \"LABPLAT\"   MCV MCV   Date Value " "Ref Range Status   07/17/2024 94.5 79.0 - 97.0 fL Final   07/16/2024 94.8 79.0 - 97.0 fL Final          Sodium Sodium   Date Value Ref Range Status   07/17/2024 137 136 - 145 mmol/L Final   07/16/2024 137 136 - 145 mmol/L Final      Potassium Potassium   Date Value Ref Range Status   07/17/2024 5.0 3.5 - 5.2 mmol/L Final   07/16/2024 5.9 (H) 3.5 - 5.2 mmol/L Final      Chloride Chloride   Date Value Ref Range Status   07/17/2024 96 (L) 98 - 107 mmol/L Final   07/16/2024 95 (L) 98 - 107 mmol/L Final      CO2 CO2   Date Value Ref Range Status   07/17/2024 27.3 22.0 - 29.0 mmol/L Final   07/16/2024 29.0 22.0 - 29.0 mmol/L Final      BUN BUN   Date Value Ref Range Status   07/17/2024 31 (H) 8 - 23 mg/dL Final   07/16/2024 47 (H) 8 - 23 mg/dL Final      Creatinine Creatinine   Date Value Ref Range Status   07/17/2024 5.32 (H) 0.76 - 1.27 mg/dL Final   07/16/2024 7.18 (H) 0.76 - 1.27 mg/dL Final      Calcium Calcium   Date Value Ref Range Status   07/17/2024 8.4 (L) 8.6 - 10.5 mg/dL Final   07/16/2024 8.6 8.6 - 10.5 mg/dL Final      PO4 No results found for: \"CAPO4\"   Albumin Albumin   Date Value Ref Range Status   07/17/2024 3.8 3.5 - 5.2 g/dL Final      Magnesium Magnesium   Date Value Ref Range Status   07/17/2024 2.6 (H) 1.6 - 2.4 mg/dL Final   07/16/2024 2.7 (H) 1.6 - 2.4 mg/dL Final      Uric Acid No results found for: \"URICACID\"        Results Review:     I reviewed the patient's new clinical results.    calcium acetate, 1,334 mg, Oral, TID With Meals  heparin (porcine), 5,000 Units, Subcutaneous, Q12H  hydrALAZINE, 50 mg, Oral, TID  sodium chloride, 10 mL, Intravenous, Q12H           Medication Review: Reviewed    Assessment & Plan       Hyperkalemia    HTN (hypertension)    ESRD on hemodialysis      Plan: HD again today and continue MWF schedule.  Dispo per primary.    Juan Parra MD  [facility]  07/17/24  08:57 EDT        Electronically signed by Juan Parra MD at 07/17/24 " 1200          Consult Notes (all)        Milli Giraldo PA-C at 07/16/24 2138        Consult Orders    1. Inpatient Neurology Consult General [[id number]] ordered by Nicolás Sheldon MD at 07/16/24 1351              Attestation signed by Flores Hoffman MD at 07/17/24 7248    I have reviewed chart, independently viewed images, examined patient and discussed case with physician extender.  The plan outlined by physician extender represents my personal medical decision making.                  Neurology Consult Note    Consult Date: 7/16/2024    Referring MD: No ref. provider found    Reason for Consult I have been asked to see the patient in neurological consultation to render advice and opinion regarding headache.    Adryan Olivarez is a 68 y.o. male with PMH of ESRD, HTN, anxiety presents to the ED with headache x 3 days. He states headache is intermittent and lasts typically about 2 hours at time and can happen once or a couple times a day. He states headache is a sharp stabbing pain in the center of his head. He denies fever, chills, photophobia, phonophobia or N/V. He denies associated blurry vision, double vision, vision loss, lacrimation, painful eyes, slurred speech, unilateral weakness/numbness, tinnitus or dizziness. He states does not get worse with position changes or bearing down. He states the headache will typically come on gradually and leave gradually. He has not taken any medication for the headaches. He states he does not typically get headaches. No personal or family history of headaches or migraines. BP on arrival was 157/74 mmHg and pulse was 82. Patient temp 97 degree F. Glucose was 106. Patient states sleeps well about 8 hours per night. He denies alcohol, caffeine, tobacco or drug use. He denies recent illness. He states that he normally only drinks about 2-4 cups of water per day.     Past Medical/Surgical Hx:  Past Medical History:   Diagnosis Date    Anxiety     Chronic kidney disease  "    Stage 3    Hypertension     Stroke 1989     Past Surgical History:   Procedure Laterality Date    INSERTION HEMODIALYSIS CATHETER Right 2/27/2021    Procedure: TUNNELED DIAYLIS CATHETER PLACEMENT;  Surgeon: Fernando Bazzi MD;  Location: [facility];  Service: Vascular;  Laterality: Right;       Medications On Admission  Medications Prior to Admission   Medication Sig Dispense Refill Last Dose    Baclofen (LIORESAL) 5 MG tablet Take 1 tablet by mouth Daily.       calcium acetate (PHOS BINDER,) 667 MG capsule capsule Take 3 capsules by mouth 3 (Three) Times a Day.       hydrALAZINE (APRESOLINE) 50 MG tablet Take 1 tablet by mouth 3 (Three) Times a Day. 90 tablet 1     sennosides-docusate (Senna Plus) 8.6-50 MG per tablet Take 2 tablets by mouth Daily.          Allergies:  No Known Allergies    Social Hx:  Social History     Socioeconomic History    Marital status:    Tobacco Use    Smoking status: Never    Smokeless tobacco: Never   Vaping Use    Vaping status: Never Used   Substance and Sexual Activity    Alcohol use: No    Drug use: No    Sexual activity: Not Currently       Family Hx:  History reviewed. No pertinent family history.      Exam    /67 (BP Location: Right arm, Patient Position: Lying)   Pulse 61   Temp 97.5 °F (36.4 °C) (Oral)   Resp 16   Ht 177.8 cm (70\")   Wt 101 kg (222 lb)   SpO2 100%   BMI 31.85 kg/m²   gen: NAD, vitals reviewed  HEENT: no nuchal rigidity  MS: oriented x3, recent/remote memory intact, normal attention/concentration, language intact, no neglect, normal fund of knowledge  CN: visual acuity grossly normal, visual fields full, PERRL, EOMI, facial sensation equal, no facial droop, hearing symmetric, palate elevates symmetrically, shoulder shrug equal, tongue midline  Motor: 5/5 throughout upper and lower extremities, normal tone  Sensation: intact to vibration and temperature throughout  Reflexes: 2+ throughout upper and lower extremities, downgoing " "plantars  Coordination: no dysmetria with finger to nose bilaterally  Gait: no ataxia, normal station    DATA:    Lab Results   Component Value Date    GLUCOSE 106 (H) 07/16/2024    CALCIUM 8.6 07/16/2024     07/16/2024    K 5.9 (H) 07/16/2024    CO2 29.0 07/16/2024    CL 95 (L) 07/16/2024    BUN 47 (H) 07/16/2024    CREATININE 7.18 (H) 07/16/2024    EGFRIFAFRI 7 (L) 08/04/2021    EGFRIFNONA 10 (L) 03/01/2021    BCR 6.5 (L) 07/16/2024    ANIONGAP 13.0 07/16/2024     Lab Results   Component Value Date    WBC 8.22 07/16/2024    HGB 12.0 (L) 07/16/2024    HCT 36.1 (L) 07/16/2024    MCV 94.8 07/16/2024     07/16/2024     No results found for: \"LDL\"  Lab Results   Component Value Date    HGBA1C 5.50 05/08/2019     Lab Results   Component Value Date    INR 1.29 (H) 07/14/2023    INR 1.11 (H) 02/26/2021    INR 1.2 01/28/2019    PROTIME 16.3 (H) 07/14/2023    PROTIME 14.1 02/26/2021    PROTIME 14.4 01/28/2019       Lab review:   K 5.9  CL 95  Glucose 106  Magnesium 2.7  Phosphorus 4.6  Creatinine 7.18  BUN 47  eGFR 7.8    Imaging review:   CTH:  FINDINGS: The brain parenchyma is normal in attenuation. The ventricles  are normal in size. I see no focal mass effect. There is no midline  shift. No extra-axial fluid collections are identified. There is no  evidence of acute intracranial hemorrhage. The calvarium and the skull  base are normal in appearance. The paranasal sinuses and the mastoid air  cells and the middle ear cavities are clear. The orbits are normal in  appearance. There is some calcified atherosclerotic plaques in the  intracranial segments of the distal vertebral arteries and the cavernous  segments of the internal carotid arteries bilaterally.  IMPRESSION:  1. There are calcified atherosclerotic plaques in the intracranial  segments of the distal vertebral arteries and cavernous segments of the  internal carotid arteries bilaterally. Otherwise, this is a normal head  CT. The etiology of this " patient's headaches is not established on this  exam.    Diagnoses:  Headache: atypical type of headache with new onset of headaches over the age of 50 so will order MRI brain with and without to further work up. CTH has been unremarkable thus far. Can treat headaches symptomatically and encourage patient to drink at least 8 cups of water daily.   HTN  ESRD    PLAN:   MRI brain with and without should be scheduled for next time patient receives dialysis inpatient.   Symptomatic management of headache  Encourage at least 2L or 8 cups of water daily, limiting caffeine and alcohol  Continue home regimen for blood pressure management    Recommendations discussed with Dr. Hoffman and she is in agreement with plan.     Electronically signed by Flores Hoffman MD at 24 0944       Win Berg MD at 24 1531        Consult Orders    1. Inpatient Nephrology Consult [[id number]] ordered by Nicolás Sheldon MD at 24 1516                                                                                                                 [facility]                                                                                             Nephrology Initial Consult Note    Patient Identification:  Name: Adryan Olivarez MRN: [id number]  Age: 68 y.o. : 1955  Sex: male  Date:2024    Requesting Physician: As per consult order.  Reason for Consultation: Hyperkalemia, ESRD  Information from:patient/ family/ chart      History of Present Illness: This is a 68 y.o. year old male with end-stage renal disease on dialysis .  He receives dialysis at [facility], nephrologist is Dr. Tovar, access is an upper extremity AV fistula.  Medical history otherwise significant for hypertension which she states has been difficult to control.  He has he came to the ER today with a headache, fairly severe with no lightheadedness dizziness, fevers or vision changes.  Blood  pressures in the 170s in the ER he was incidentally noted to have hyperkalemia and was admitted for dialysis.  Patient was seen and examined on hemodialysis, achieving prescribed blood flow and dialysate flow rate, arterial venous pressure around 180, 2K bath, UF 2 L planned.    The following medical history and medications personally reviewed by me:    Problem List:     Hyperkalemia    HTN (hypertension)    ESRD on hemodialysis      Past Medical History:  Past Medical History:   Diagnosis Date    Anxiety     Chronic kidney disease     Stage 3    Hypertension     Stroke 1989       Past Surgical History:  Past Surgical History:   Procedure Laterality Date    INSERTION HEMODIALYSIS CATHETER Right 2/27/2021    Procedure: TUNNELED DIAYLIS CATHETER PLACEMENT;  Surgeon: Fernando Bazzi MD;  Location: [facility];  Service: Vascular;  Laterality: Right;        Home Meds:   (Not in a hospital admission)      Current Meds:   Current Facility-Administered Medications   Medication Dose Route Frequency Provider Last Rate Last Admin    acetaminophen (TYLENOL) tablet 650 mg  650 mg Oral Q4H PRN Nicolás Sheldon MD        Or    acetaminophen (TYLENOL) 160 MG/5ML oral solution 650 mg  650 mg Oral Q4H PRN Nicolás Sheldon MD        Or    acetaminophen (TYLENOL) suppository 650 mg  650 mg Rectal Q4H PRN Nicolás Sheldon MD        baclofen (LIORESAL) tablet 5 mg  5 mg Oral Daily PRN Nicolás Sheldon MD        sennosides-docusate (PERICOLACE) 8.6-50 MG per tablet 2 tablet  2 tablet Oral BID PRN Nicolás Sheldon MD        And    polyethylene glycol (MIRALAX) packet 17 g  17 g Oral Daily PRN Nicolás Sheldon MD        And    bisacodyl (DULCOLAX) EC tablet 5 mg  5 mg Oral Daily PRN Nicolás Sheldon MD        And    bisacodyl (DULCOLAX) suppository 10 mg  10 mg Rectal Daily PRN Nicolás Sheldon MD        heparin (porcine) 5000 UNIT/ML injection 5,000 Units  5,000 Units Subcutaneous Q12H Nicolás Sheldon  MD        hydrALAZINE (APRESOLINE) tablet 50 mg  50 mg Oral TID Nicolás Sheldon MD        nitroglycerin (NITROSTAT) SL tablet 0.4 mg  0.4 mg Sublingual Q5 Min PRN Nicolás Sheldon MD        ondansetron ODT (ZOFRAN-ODT) disintegrating tablet 4 mg  4 mg Oral Q6H PRN Nicolás Sheldon MD        Or    ondansetron (ZOFRAN) injection 4 mg  4 mg Intravenous Q6H PRN Nicolás Sheldon MD        sodium chloride 0.9 % flush 10 mL  10 mL Intravenous PRN Fernando Dias II, MD        sodium chloride 0.9 % flush 10 mL  10 mL Intravenous Q12H Nicolás Sheldon MD        sodium chloride 0.9 % flush 10 mL  10 mL Intravenous PRN Nicolás Sheldon MD        sodium chloride 0.9 % infusion 40 mL  40 mL Intravenous PRN Nicolás Sheldon MD         Current Outpatient Medications   Medication Sig Dispense Refill    Baclofen (LIORESAL) 5 MG tablet Take 1 tablet by mouth Daily.      calcium acetate (PHOS BINDER,) 667 MG capsule capsule Take 3 capsules by mouth 3 (Three) Times a Day.      hydrALAZINE (APRESOLINE) 50 MG tablet Take 1 tablet by mouth 3 (Three) Times a Day. 90 tablet 1    sennosides-docusate (Senna Plus) 8.6-50 MG per tablet Take 2 tablets by mouth Daily.         Allergies:  No Known Allergies    Social History:   Social History     Socioeconomic History    Marital status:    Tobacco Use    Smoking status: Never    Smokeless tobacco: Never   Vaping Use    Vaping status: Never Used   Substance and Sexual Activity    Alcohol use: No    Drug use: No    Sexual activity: Defer        Family History:  No family history on file.     Review of Systems: as per HPI, in addition:    General:      Denies weakness / fatigue,                       No fevers / chills                       no weight loss  HEENT;       no dysphagia or visual changes, + headache  Neck:           normal range of motion, no swelling  Respiratory: no cough / congestion                      No shortness of air                       No  "wheezing  CV:              No chest pain                       No palpitations  Abdomen/GI: no nausea / vomiting                      No diarrhea / constipation                      No abdominal pain  :             no dysuria / urinary frequency                       No urgency, normal output  Endocrine:   no polyuria / polydipsia,                      No heat or cold intolerance  Skin:           Denies rashes or skin lesions   Vascular:   No edema  Musculoskeletal: Denies joint pain or deformities      Physical Exam:  Vitals:   Temp (24hrs), Av °F (36.1 °C), Min:97 °F (36.1 °C), Max:97 °F (36.1 °C)    /83   Pulse 62   Temp 97 °F (36.1 °C)   Resp 16   Ht 177.8 cm (70\")   Wt 101 kg (222 lb)   SpO2 98%   BMI 31.85 kg/m²   Intake/Output:   No intake or output data in the 24 hours ending 24 1531     Wt Readings from Last 1 Encounters:   24 0937 101 kg (222 lb)       Exam:    General Appearance:  Awake, alert, no acute distress  Well-appearing   Head and Face:  Normocephalic, atraumatic, mucus membranes moist, oropharynx clear   Eyes:  No icterus, pupils equal round and reactive to light, extraocular movements intact    ENMT: Moist mucosa, tongue symmetric    Neck: Supple  no jugular venous distention  no thyromegaly   Pulmonary:  Respiratory effort: Normal  Auscultation of lungs: Clear bilaterally  No wheezes  No rhonchi  Good air movement, good expansion   Chest wall:  No tenderness or deformity   Cardiovascular:  Auscultation of the heart: Normal rhythm, no murmurs  No edema of bilateral lower extremities   Abdomen:  Abdomen: soft, non-tender, normal bowel sounds all four quadrants, no masses   Liver and spleen: no hepatosplenomegaly   Musculoskeletal: Digits and nails: normal  Normal range of motion  No joint swelling or gross deformities    Skin: Skin inspection: color normal, no visible rashes or lesions  Skin palpation: texture, turgor normal, no palpable lesions   Lymphatic:  no " cervical lymphadenopathy    Psychiatric: Judgement and insight: normal  Orientation to person place and time: normal  Mood and affect: normal     Hyperkalemia, ESRD  DATA:  Radiology and Labs:   The following labs independently reviewed by me, additional AM labs ordered  Old records independently reviewed showing hyperkalemia, ESRD  The following radiologic studies independently viewed by me, findings heart  Interval notes, chart personally reviewed by me.  I have reviewed and summarized old records as detailed above  Plan of care discussed with CT head showed atherosclerotic plaques otherwise normal CT head  New problems include hyperkalemia with renal failure    Dialysis patient access: AV fistula    Risk/ complexity of medical care/ medical decision making: Moderate complexity, dialysis management  Chronic illness with severe exacerbation or progression      Labs:   Recent Results (from the past 24 hour(s))   Basic Metabolic Panel    Collection Time: 07/16/24 10:19 AM    Specimen: Blood   Result Value Ref Range    Glucose 106 (H) 65 - 99 mg/dL    BUN 47 (H) 8 - 23 mg/dL    Creatinine 7.18 (H) 0.76 - 1.27 mg/dL    Sodium 137 136 - 145 mmol/L    Potassium 5.9 (H) 3.5 - 5.2 mmol/L    Chloride 95 (L) 98 - 107 mmol/L    CO2 29.0 22.0 - 29.0 mmol/L    Calcium 8.6 8.6 - 10.5 mg/dL    BUN/Creatinine Ratio 6.5 (L) 7.0 - 25.0    Anion Gap 13.0 5.0 - 15.0 mmol/L    eGFR 7.8 (L) >60.0 mL/min/1.73   CBC Auto Differential    Collection Time: 07/16/24 10:19 AM    Specimen: Blood   Result Value Ref Range    WBC 8.22 3.40 - 10.80 10*3/mm3    RBC 3.81 (L) 4.14 - 5.80 10*6/mm3    Hemoglobin 12.0 (L) 13.0 - 17.7 g/dL    Hematocrit 36.1 (L) 37.5 - 51.0 %    MCV 94.8 79.0 - 97.0 fL    MCH 31.5 26.6 - 33.0 pg    MCHC 33.2 31.5 - 35.7 g/dL    RDW 12.1 (L) 12.3 - 15.4 %    RDW-SD 41.8 37.0 - 54.0 fl    MPV 9.2 6.0 - 12.0 fL    Platelets 208 140 - 450 10*3/mm3    Neutrophil % 68.4 42.7 - 76.0 %    Lymphocyte % 17.3 (L) 19.6 - 45.3 %     Monocyte % 10.3 5.0 - 12.0 %    Eosinophil % 3.4 0.3 - 6.2 %    Basophil % 0.5 0.0 - 1.5 %    Immature Grans % 0.1 0.0 - 0.5 %    Neutrophils, Absolute 5.62 1.70 - 7.00 10*3/mm3    Lymphocytes, Absolute 1.42 0.70 - 3.10 10*3/mm3    Monocytes, Absolute 0.85 0.10 - 0.90 10*3/mm3    Eosinophils, Absolute 0.28 0.00 - 0.40 10*3/mm3    Basophils, Absolute 0.04 0.00 - 0.20 10*3/mm3    Immature Grans, Absolute 0.01 0.00 - 0.05 10*3/mm3    nRBC 0.0 0.0 - 0.2 /100 WBC   Magnesium    Collection Time: 07/16/24 10:19 AM    Specimen: Blood   Result Value Ref Range    Magnesium 2.7 (H) 1.6 - 2.4 mg/dL   Phosphorus    Collection Time: 07/16/24 10:19 AM    Specimen: Blood   Result Value Ref Range    Phosphorus 4.6 (H) 2.5 - 4.5 mg/dL   ECG 12 Lead Electrolyte Imbalance    Collection Time: 07/16/24 11:16 AM   Result Value Ref Range    QT Interval 474 ms    QTC Interval 482 ms       Radiology:  Imaging Results (Last 24 Hours)       Procedure Component Value Units Date/Time    CT Head Without Contrast [[id number]] Collected: 07/16/24 1135     Updated: 07/16/24 1302    Narrative:      EMERGENCY CT SCAN OF THE HEAD WITHOUT CONTRAST ON 07/16/2024     CLINICAL HISTORY: This is a 68-year-old male patient who has had a  headache for 2 months.     TECHNIQUE: Spiral CT images were obtained from the base of the skull to  the vertex without intravenous contrast. The images were reformatted and  are submitted in 3 mm thick axial, sagittal and coronal CT sections with  brain algorithm.     There are no prior head CTs or MRIs of the brain from [facility] for comparison.     FINDINGS: The brain parenchyma is normal in attenuation. The ventricles  are normal in size. I see no focal mass effect. There is no midline  shift. No extra-axial fluid collections are identified. There is no  evidence of acute intracranial hemorrhage. The calvarium and the skull  base are normal in appearance. The paranasal sinuses and the mastoid air  cells and  the middle ear cavities are clear. The orbits are normal in  appearance. There is some calcified atherosclerotic plaques in the  intracranial segments of the distal vertebral arteries and the cavernous  segments of the internal carotid arteries bilaterally.       Impression:         1. There are calcified atherosclerotic plaques in the intracranial  segments of the distal vertebral arteries and cavernous segments of the  internal carotid arteries bilaterally. Otherwise, this is a normal head  CT. The etiology of this patient's headaches is not established on this  exam.     Radiation dose reduction techniques were utilized, including automated  exposure control and exposure modulation based on body size.        This report was finalized on 7/16/2024 12:59 PM by Dr. Maximo Gonzalez M.D  on Workstation: [id number]                    ASSESSMENT:     Hyperkalemia    HTN (hypertension)    ESRD on hemodialysis  Headache with negative CT head  Anemia of chronic kidney disease      DISCUSSION/PLAN:   His potassium was 5.9 on chemistries.  He is being admitted for hemodialysis  Will run on a 2K bath, 3-1/2 hours.  UF 2 L  BP has improved with dialysis treatment  Resume home BP meds  Phosphorus binders with meals  Hemoglobin above threshold for starting RAGHAV    Continue to monitor electrolytes and volume closely  Renal, low potassium diet  Depending on his labs in the morning we will need to decide what his dialysis schedule will be for the rest of the week.    I appreciate the consult request.  Please send me a secure chat message with any nonurgent questions regarding patient care.  For any urgent patient care issues please call my office number below.      Win Berg MD  [facility]  Office phone number: [phone]  Answering service phone number: [phone]      7/16/2024        Dictation via Dragon dictation software      Electronically signed by Win Berg MD at 07/16/24 7905

## 2024-07-17 NOTE — PROGRESS NOTES
Name: Adryan Olivarez ADMIT: 2024   : 1955  PCP: Freda Hall APRN    MRN: [id number] LOS: 0 days   AGE/SEX: 68 y.o. male  ROOM: [facility]     Subjective   Subjective   Chief Complaint   Patient presents with    Headache     Not reporting any chest pain palpitations or shortness of breath.  His headache has resolved today.     Objective   Objective   Vital Signs  Temp:  [97.1 °F (36.2 °C)-98.2 °F (36.8 °C)] 97.3 °F (36.3 °C)  Heart Rate:  [61-74] 63  Resp:  [16-18] 18  BP: (128-169)/(57-81) 129/57  SpO2:  [97 %-100 %] 97 %  on   ;   Device (Oxygen Therapy): room air  Body mass index is 31.85 kg/m².    Physical Exam  Vitals and nursing note reviewed.   Constitutional:       General: He is not in acute distress.     Appearance: He is not diaphoretic.   Cardiovascular:      Rate and Rhythm: Normal rate and regular rhythm.   Pulmonary:      Effort: Pulmonary effort is normal.      Breath sounds: No wheezing.   Abdominal:      Palpations: Abdomen is soft.      Tenderness: There is no abdominal tenderness. There is no guarding or rebound.   Musculoskeletal:         General: No tenderness.   Skin:     General: Skin is warm and dry.   Neurological:      Mental Status: He is alert. Mental status is at baseline.   Psychiatric:         Mood and Affect: Mood normal.         Behavior: Behavior normal.       Results Review  I reviewed the patient's new clinical results.    Results from last 7 days   Lab Units 24  0513 24  1019   WBC 10*3/mm3 8.13 8.22   HEMOGLOBIN g/dL 12.4* 12.0*   PLATELETS 10*3/mm3 209 208     Results from last 7 days   Lab Units 24  0513 24  1019   SODIUM mmol/L 137 137   POTASSIUM mmol/L 5.0 5.9*   CHLORIDE mmol/L 96* 95*   CO2 mmol/L 27.3 29.0   BUN mg/dL 31* 47*   CREATININE mg/dL 5.32* 7.18*   GLUCOSE mg/dL 83 106*   EGFR mL/min/1.73 11.2* 7.8*     Results from last 7 days   Lab Units 24  0513   ALBUMIN g/dL 3.8   BILIRUBIN mg/dL 0.3   ALK PHOS U/L 88   AST  "(SGOT) U/L 11   ALT (SGPT) U/L 14     Results from last 7 days   Lab Units 07/17/24  0513 07/16/24  1019   CALCIUM mg/dL 8.4* 8.6   ALBUMIN g/dL 3.8  --    MAGNESIUM mg/dL 2.6* 2.7*   PHOSPHORUS mg/dL 5.1* 4.6*         No results found for: \"HGBA1C\", \"POCGLU\"    CT Head Without Contrast    Result Date: 7/16/2024   1. There are calcified atherosclerotic plaques in the intracranial segments of the distal vertebral arteries and cavernous segments of the internal carotid arteries bilaterally. Otherwise, this is a normal head CT. The etiology of this patient's headaches is not established on this exam.  Radiation dose reduction techniques were utilized, including automated exposure control and exposure modulation based on body size.   This report was finalized on 7/16/2024 12:59 PM by Dr. Maximo Gonzalez M.D on Workstation: [id number]       I have personally reviewed all medications:  Scheduled Medications  calcium acetate, 1,334 mg, Oral, TID With Meals  heparin (porcine), 5,000 Units, Subcutaneous, Q12H  hydrALAZINE, 100 mg, Oral, TID  sodium chloride, 10 mL, Intravenous, Q12H      Infusions     Diet  Diet: Renal; Low Sodium (2-3g), Low Potassium, Low Phosphorus; Fluid Consistency: Thin (IDDSI 0)    I have personally reviewed:  [x]  Laboratory   []  Microbiology   []  Radiology   [x]  EKG/Telemetry  []  Cardiology/Vascular   []  Pathology    []  Records       Assessment/Plan     Active Hospital Problems    Diagnosis  POA    **Hyperkalemia [E87.5]  Yes    ESRD on hemodialysis [N18.6, Z99.2]  Not Applicable    HTN (hypertension) [I10]  Yes      Resolved Hospital Problems   No resolved problems to display.       68 y.o. male admitted with Hyperkalemia.    ESRD with hyperkalemia: Potassium improved after dialysis.  Nephrology following.  Hypertension: Have resumed home regimen.  Monitoring.  Bigeminy: Had bigeminy last night on telemetry.  Did not see any heart block on the previous EKG.  Will repeat EKG to monitor.  He " had a normal rate on exam today.  Headache: Symptoms are improved today per patient report.  Neurology following.  PPX: Heparin  Disposition: Home/possibly tomorrow    Expected Discharge Date: 7/18/2024; Expected Discharge Time:      Nicolás Sheldon MD  [facility]  07/17/24  11:59 EDT    Dictated portions of note using Dragon dictation software.  Copied text in this note has been reviewed by me and remains accurate as of 07/17/24

## 2024-07-17 NOTE — NURSING NOTE
Pt primary language is Hebrew. Pt is not able to complete consent documents due to inability to read or write English or Hebrew. RN contacted son, Nura Olivarez, for verbal consent for MRI. MRI screening faxed; verified with department that form was received.    Pt son José needs updated phone number; called and was not in service.

## 2024-07-17 NOTE — CONSULTS
Neurology Consult Note    Consult Date: 7/16/2024    Referring MD: No ref. provider found    Reason for Consult I have been asked to see the patient in neurological consultation to render advice and opinion regarding headache.    Adryan Olivarez is a 68 y.o. male with PMH of ESRD, HTN, anxiety presents to the ED with headache x 3 days. He states headache is intermittent and lasts typically about 2 hours at time and can happen once or a couple times a day. He states headache is a sharp stabbing pain in the center of his head. He denies fever, chills, photophobia, phonophobia or N/V. He denies associated blurry vision, double vision, vision loss, lacrimation, painful eyes, slurred speech, unilateral weakness/numbness, tinnitus or dizziness. He states does not get worse with position changes or bearing down. He states the headache will typically come on gradually and leave gradually. He has not taken any medication for the headaches. He states he does not typically get headaches. No personal or family history of headaches or migraines. BP on arrival was 157/74 mmHg and pulse was 82. Patient temp 97 degree F. Glucose was 106. Patient states sleeps well about 8 hours per night. He denies alcohol, caffeine, tobacco or drug use. He denies recent illness. He states that he normally only drinks about 2-4 cups of water per day.     Past Medical/Surgical Hx:  Past Medical History:   Diagnosis Date    Anxiety     Chronic kidney disease     Stage 3    Hypertension     Stroke 1989     Past Surgical History:   Procedure Laterality Date    INSERTION HEMODIALYSIS CATHETER Right 2/27/2021    Procedure: TUNNELED DIAYLIS CATHETER PLACEMENT;  Surgeon: Fernando Bazzi MD;  Location: [facility];  Service: Vascular;  Laterality: Right;       Medications On Admission  Medications Prior to Admission   Medication Sig Dispense Refill Last Dose    Baclofen (LIORESAL) 5 MG tablet Take 1 tablet by mouth Daily.       calcium acetate (PHOS  "BINDER,) 667 MG capsule capsule Take 3 capsules by mouth 3 (Three) Times a Day.       hydrALAZINE (APRESOLINE) 50 MG tablet Take 1 tablet by mouth 3 (Three) Times a Day. 90 tablet 1     sennosides-docusate (Senna Plus) 8.6-50 MG per tablet Take 2 tablets by mouth Daily.          Allergies:  No Known Allergies    Social Hx:  Social History     Socioeconomic History    Marital status:    Tobacco Use    Smoking status: Never    Smokeless tobacco: Never   Vaping Use    Vaping status: Never Used   Substance and Sexual Activity    Alcohol use: No    Drug use: No    Sexual activity: Not Currently       Family Hx:  History reviewed. No pertinent family history.      Exam    /67 (BP Location: Right arm, Patient Position: Lying)   Pulse 61   Temp 97.5 °F (36.4 °C) (Oral)   Resp 16   Ht 177.8 cm (70\")   Wt 101 kg (222 lb)   SpO2 100%   BMI 31.85 kg/m²   gen: NAD, vitals reviewed  HEENT: no nuchal rigidity  MS: oriented x3, recent/remote memory intact, normal attention/concentration, language intact, no neglect, normal fund of knowledge  CN: visual acuity grossly normal, visual fields full, PERRL, EOMI, facial sensation equal, no facial droop, hearing symmetric, palate elevates symmetrically, shoulder shrug equal, tongue midline  Motor: 5/5 throughout upper and lower extremities, normal tone  Sensation: intact to vibration and temperature throughout  Reflexes: 2+ throughout upper and lower extremities, downgoing plantars  Coordination: no dysmetria with finger to nose bilaterally  Gait: no ataxia, normal station    DATA:    Lab Results   Component Value Date    GLUCOSE 106 (H) 07/16/2024    CALCIUM 8.6 07/16/2024     07/16/2024    K 5.9 (H) 07/16/2024    CO2 29.0 07/16/2024    CL 95 (L) 07/16/2024    BUN 47 (H) 07/16/2024    CREATININE 7.18 (H) 07/16/2024    EGFRIFAFRI 7 (L) 08/04/2021    EGFRIFNONA 10 (L) 03/01/2021    BCR 6.5 (L) 07/16/2024    ANIONGAP 13.0 07/16/2024     Lab Results   Component " "Value Date    WBC 8.22 07/16/2024    HGB 12.0 (L) 07/16/2024    HCT 36.1 (L) 07/16/2024    MCV 94.8 07/16/2024     07/16/2024     No results found for: \"LDL\"  Lab Results   Component Value Date    HGBA1C 5.50 05/08/2019     Lab Results   Component Value Date    INR 1.29 (H) 07/14/2023    INR 1.11 (H) 02/26/2021    INR 1.2 01/28/2019    PROTIME 16.3 (H) 07/14/2023    PROTIME 14.1 02/26/2021    PROTIME 14.4 01/28/2019       Lab review:   K 5.9  CL 95  Glucose 106  Magnesium 2.7  Phosphorus 4.6  Creatinine 7.18  BUN 47  eGFR 7.8    Imaging review:   CTH:  FINDINGS: The brain parenchyma is normal in attenuation. The ventricles  are normal in size. I see no focal mass effect. There is no midline  shift. No extra-axial fluid collections are identified. There is no  evidence of acute intracranial hemorrhage. The calvarium and the skull  base are normal in appearance. The paranasal sinuses and the mastoid air  cells and the middle ear cavities are clear. The orbits are normal in  appearance. There is some calcified atherosclerotic plaques in the  intracranial segments of the distal vertebral arteries and the cavernous  segments of the internal carotid arteries bilaterally.  IMPRESSION:  1. There are calcified atherosclerotic plaques in the intracranial  segments of the distal vertebral arteries and cavernous segments of the  internal carotid arteries bilaterally. Otherwise, this is a normal head  CT. The etiology of this patient's headaches is not established on this  exam.    Diagnoses:  Headache: atypical type of headache with new onset of headaches over the age of 50 so will order MRI brain with and without to further work up. CTH has been unremarkable thus far. Can treat headaches symptomatically and encourage patient to drink at least 8 cups of water daily.   HTN  ESRD    PLAN:   MRI brain with and without should be scheduled for next time patient receives dialysis inpatient.   Symptomatic management of " headache  Encourage at least 2L or 8 cups of water daily, limiting caffeine and alcohol  Continue home regimen for blood pressure management    Recommendations discussed with Dr. Hoffman and she is in agreement with plan.

## 2024-07-17 NOTE — CASE MANAGEMENT/SOCIAL WORK
Discharge Planning Assessment  [facility]     Patient Name: Adryan Olivarez  MRN: [id number]  Today's Date: 7/17/2024    Admit Date: 7/16/2024    Plan: Home with son   Discharge Needs Assessment       Row Name 07/17/24 0900       Living Environment    People in Home child(christiano), adult    Current Living Arrangements home    Potentially Unsafe Housing Conditions none    Primary Care Provided by self;child(christiano)    Provides Primary Care For no one    Family Caregiver if Needed child(christiano), adult    Quality of Family Relationships helpful;supportive    Able to Return to Prior Arrangements yes       Resource/Environmental Concerns    Resource/Environmental Concerns none       Transportation Needs    In the past 12 months, has lack of transportation kept you from medical appointments or from getting medications? no    In the past 12 months, has lack of transportation kept you from meetings, work, or from getting things needed for daily living? No       Transition Planning    Patient/Family Anticipates Transition to home    Patient/Family Anticipated Services at Transition none    Transportation Anticipated family or friend will provide;car, drives self       Discharge Needs Assessment    Current Outpatient/Agency/Support Group outpatient hemodialysis    Equipment Currently Used at Home none    Concerns to be Addressed denies needs/concerns at this time    Anticipated Changes Related to Illness none    Equipment Needed After Discharge none    Provided Post Acute Provider List? N/A    Provided Post Acute Provider Quality & Resource List? N/A                   Discharge Plan       Row Name 07/17/24 0902       Plan    Plan Home with son    Plan Comments S/W pt at bedside.  Facesheet info confirmed.  Pt lives in a multi story house w/ his son José, is IADLs and can drive.  He does not use any home DME.  He goes to HD on a MWF schedule at [facility] - he drives himself.  No hx of HH or DME.  Pt denies any DC needs  at this time. Family will provide transport home upo DC.  CCP will continue to follow and assist if needed.........Emily STOVER/ RIGO                  Continued Care and Services - Admitted Since 7/16/2024    No active coordination exists for this encounter.          Demographic Summary       Row Name 07/17/24 0859       General Information    Admission Type observation    Arrived From home    Required Notices Provided Observation Status Notice    Referral Source admission list    Reason for Consult discharge planning    Preferred Language French                   Functional Status       Row Name 07/17/24 0859       Functional Status    Usual Activity Tolerance good    Current Activity Tolerance good       Functional Status, IADL    Medications independent    Meal Preparation independent;assistive person    Housekeeping independent;assistive person    Laundry independent;assistive person    Shopping independent;assistive person       Mental Status    General Appearance WDL WDL       Mental Status Summary    Recent Changes in Mental Status/Cognitive Functioning no changes       Employment/    Employment Status self-employed                          Emily Cowan RN

## 2024-07-17 NOTE — PROGRESS NOTES
"RENAL/KCC:     LOS: 0 days    Patient Care Team:  Freda Hall APRN as PCP - General (Family Medicine)    Chief Complaint:  ESRD    Subjective     Interval History:   Chart reviewed    Objective     Vital Sign Min/Max for last 24 hours  Temp  Min: 97 °F (36.1 °C)  Max: 98.2 °F (36.8 °C)   BP  Min: 128/68  Max: 173/83   Pulse  Min: 56  Max: 82   Resp  Min: 16  Max: 18   SpO2  Min: 94 %  Max: 100 %   No data recorded   Weight  Min: 101 kg (222 lb)  Max: 101 kg (222 lb)     Flowsheet Rows      Flowsheet Row First Filed Value   Admission Height 177.8 cm (70\") Documented at 07/16/2024 0937   Admission Weight 101 kg (222 lb) Documented at 07/16/2024 0937            No intake/output data recorded.  I/O last 3 completed shifts:  In: -   Out: 2000     Physical Exam:  GEN: Awake, NAD  ENT: PERRL, EOMI, MMM  NECK: Supple, no JVD  CHEST: CTAB, no W/R/C  CV: RRR, no M/G/R  ABD: Soft, NT, +BS  SKIN: Warm and Dry  NEURO: CN's intact      WBC WBC   Date Value Ref Range Status   07/17/2024 8.13 3.40 - 10.80 10*3/mm3 Final   07/16/2024 8.22 3.40 - 10.80 10*3/mm3 Final      HGB Hemoglobin   Date Value Ref Range Status   07/17/2024 12.4 (L) 13.0 - 17.7 g/dL Final   07/16/2024 12.0 (L) 13.0 - 17.7 g/dL Final      HCT Hematocrit   Date Value Ref Range Status   07/17/2024 35.8 (L) 37.5 - 51.0 % Final   07/16/2024 36.1 (L) 37.5 - 51.0 % Final      Platlets No results found for: \"LABPLAT\"   MCV MCV   Date Value Ref Range Status   07/17/2024 94.5 79.0 - 97.0 fL Final   07/16/2024 94.8 79.0 - 97.0 fL Final          Sodium Sodium   Date Value Ref Range Status   07/17/2024 137 136 - 145 mmol/L Final   07/16/2024 137 136 - 145 mmol/L Final      Potassium Potassium   Date Value Ref Range Status   07/17/2024 5.0 3.5 - 5.2 mmol/L Final   07/16/2024 5.9 (H) 3.5 - 5.2 mmol/L Final      Chloride Chloride   Date Value Ref Range Status   07/17/2024 96 (L) 98 - 107 mmol/L Final   07/16/2024 95 (L) 98 - 107 mmol/L Final      CO2 CO2   Date Value Ref " "Range Status   07/17/2024 27.3 22.0 - 29.0 mmol/L Final   07/16/2024 29.0 22.0 - 29.0 mmol/L Final      BUN BUN   Date Value Ref Range Status   07/17/2024 31 (H) 8 - 23 mg/dL Final   07/16/2024 47 (H) 8 - 23 mg/dL Final      Creatinine Creatinine   Date Value Ref Range Status   07/17/2024 5.32 (H) 0.76 - 1.27 mg/dL Final   07/16/2024 7.18 (H) 0.76 - 1.27 mg/dL Final      Calcium Calcium   Date Value Ref Range Status   07/17/2024 8.4 (L) 8.6 - 10.5 mg/dL Final   07/16/2024 8.6 8.6 - 10.5 mg/dL Final      PO4 No results found for: \"CAPO4\"   Albumin Albumin   Date Value Ref Range Status   07/17/2024 3.8 3.5 - 5.2 g/dL Final      Magnesium Magnesium   Date Value Ref Range Status   07/17/2024 2.6 (H) 1.6 - 2.4 mg/dL Final   07/16/2024 2.7 (H) 1.6 - 2.4 mg/dL Final      Uric Acid No results found for: \"URICACID\"        Results Review:     I reviewed the patient's new clinical results.    calcium acetate, 1,334 mg, Oral, TID With Meals  heparin (porcine), 5,000 Units, Subcutaneous, Q12H  hydrALAZINE, 50 mg, Oral, TID  sodium chloride, 10 mL, Intravenous, Q12H           Medication Review: Reviewed    Assessment & Plan       Hyperkalemia    HTN (hypertension)    ESRD on hemodialysis      Plan: HD again today and continue MWF schedule.  Dispo per primary.    Juan Parra MD  [facility]  07/17/24  08:57 EDT      "

## 2024-07-17 NOTE — NURSING NOTE
RN contacted cardiology for EKG completion due to peaked T wave reported by CTU; spoke with Nandini to confirm testing will be completed.

## 2024-07-18 VITALS
TEMPERATURE: 97.7 F | OXYGEN SATURATION: 99 % | RESPIRATION RATE: 18 BRPM | WEIGHT: 222 LBS | SYSTOLIC BLOOD PRESSURE: 151 MMHG | HEART RATE: 85 BPM | BODY MASS INDEX: 31.78 KG/M2 | HEIGHT: 70 IN | DIASTOLIC BLOOD PRESSURE: 72 MMHG

## 2024-07-18 LAB
ALBUMIN SERPL-MCNC: 4 G/DL (ref 3.5–5.2)
ANION GAP SERPL CALCULATED.3IONS-SCNC: 14.9 MMOL/L (ref 5–15)
BUN SERPL-MCNC: 38 MG/DL (ref 8–23)
BUN/CREAT SERPL: 7.3 (ref 7–25)
CALCIUM SPEC-SCNC: 8.8 MG/DL (ref 8.6–10.5)
CHLORIDE SERPL-SCNC: 93 MMOL/L (ref 98–107)
CO2 SERPL-SCNC: 26.1 MMOL/L (ref 22–29)
CREAT SERPL-MCNC: 5.22 MG/DL (ref 0.76–1.27)
DEPRECATED RDW RBC AUTO: 41.9 FL (ref 37–54)
EGFRCR SERPLBLD CKD-EPI 2021: 11.4 ML/MIN/1.73
ERYTHROCYTE [DISTWIDTH] IN BLOOD BY AUTOMATED COUNT: 12.2 % (ref 12.3–15.4)
GLUCOSE SERPL-MCNC: 87 MG/DL (ref 65–99)
HBV SURFACE AG SERPL QL IA: NORMAL
HBV SURFACE AG SERPL QL NT: NEGATIVE
HCT VFR BLD AUTO: 38.3 % (ref 37.5–51)
HGB BLD-MCNC: 13 G/DL (ref 13–17.7)
MAGNESIUM SERPL-MCNC: 2.6 MG/DL (ref 1.6–2.4)
MCH RBC QN AUTO: 31.8 PG (ref 26.6–33)
MCHC RBC AUTO-ENTMCNC: 33.9 G/DL (ref 31.5–35.7)
MCV RBC AUTO: 93.6 FL (ref 79–97)
PHOSPHATE SERPL-MCNC: 4.5 MG/DL (ref 2.5–4.5)
PLATELET # BLD AUTO: 216 10*3/MM3 (ref 140–450)
PMV BLD AUTO: 9.7 FL (ref 6–12)
POTASSIUM SERPL-SCNC: 4.8 MMOL/L (ref 3.5–5.2)
RBC # BLD AUTO: 4.09 10*6/MM3 (ref 4.14–5.8)
SODIUM SERPL-SCNC: 134 MMOL/L (ref 136–145)
WBC NRBC COR # BLD AUTO: 9.19 10*3/MM3 (ref 3.4–10.8)

## 2024-07-18 PROCEDURE — 85027 COMPLETE CBC AUTOMATED: CPT | Performed by: INTERNAL MEDICINE

## 2024-07-18 PROCEDURE — G0378 HOSPITAL OBSERVATION PER HR: HCPCS

## 2024-07-18 PROCEDURE — 80069 RENAL FUNCTION PANEL: CPT | Performed by: INTERNAL MEDICINE

## 2024-07-18 PROCEDURE — 83735 ASSAY OF MAGNESIUM: CPT | Performed by: INTERNAL MEDICINE

## 2024-07-18 PROCEDURE — 96372 THER/PROPH/DIAG INJ SC/IM: CPT

## 2024-07-18 PROCEDURE — 25010000002 HEPARIN (PORCINE) PER 1000 UNITS: Performed by: INTERNAL MEDICINE

## 2024-07-18 RX ADMIN — HYDRALAZINE HYDROCHLORIDE 100 MG: 50 TABLET ORAL at 08:24

## 2024-07-18 RX ADMIN — CALCIUM ACETATE 1334 MG: 667 CAPSULE ORAL at 13:00

## 2024-07-18 RX ADMIN — HEPARIN SODIUM 5000 UNITS: 5000 INJECTION INTRAVENOUS; SUBCUTANEOUS at 08:24

## 2024-07-18 RX ADMIN — CALCIUM ACETATE 1334 MG: 667 CAPSULE ORAL at 08:24

## 2024-07-18 RX ADMIN — Medication 10 ML: at 08:25

## 2024-07-18 NOTE — PLAN OF CARE
Problem: Adult Inpatient Plan of Care  Goal: Plan of Care Review  Outcome: Ongoing, Progressing

## 2024-07-18 NOTE — PROGRESS NOTES
"RENAL/KCC:     LOS: 0 days    Patient Care Team:  Freda Hall APRN as PCP - General (Family Medicine)    Chief Complaint:  ESRD    Subjective     Interval History:   Chart reviewed  He looks and feels well today denies new complaints  Completed dialysis yesterday without any difficulty or access issues    Objective     Vital Sign Min/Max for last 24 hours  Temp  Min: 97.3 °F (36.3 °C)  Max: 98.2 °F (36.8 °C)   BP  Min: 128/64  Max: 151/72   Pulse  Min: 71  Max: 85   Resp  Min: 16  Max: 18   SpO2  Min: 95 %  Max: 99 %   No data recorded   No data recorded     Flowsheet Rows      Flowsheet Row First Filed Value   Admission Height 177.8 cm (70\") Documented at 07/16/2024 0937   Admission Weight 101 kg (222 lb) Documented at 07/16/2024 0937            No intake/output data recorded.  I/O last 3 completed shifts:  In: -   Out: 2000     Physical Exam:  GEN: Awake, NAD  ENT: PERRL, EOMI, MMM  NECK: Supple, no JVD  CHEST: CTAB, no W/R/C  CV: RRR, no M/G/R  ABD: Soft, NT, +BS  SKIN: Warm and Dry  NEURO: CN's intact      WBC WBC   Date Value Ref Range Status   07/18/2024 9.19 3.40 - 10.80 10*3/mm3 Final   07/17/2024 8.13 3.40 - 10.80 10*3/mm3 Final   07/16/2024 8.22 3.40 - 10.80 10*3/mm3 Final      HGB Hemoglobin   Date Value Ref Range Status   07/18/2024 13.0 13.0 - 17.7 g/dL Final   07/17/2024 12.4 (L) 13.0 - 17.7 g/dL Final   07/16/2024 12.0 (L) 13.0 - 17.7 g/dL Final      HCT Hematocrit   Date Value Ref Range Status   07/18/2024 38.3 37.5 - 51.0 % Final   07/17/2024 35.8 (L) 37.5 - 51.0 % Final   07/16/2024 36.1 (L) 37.5 - 51.0 % Final      Platlets No results found for: \"LABPLAT\"   MCV MCV   Date Value Ref Range Status   07/18/2024 93.6 79.0 - 97.0 fL Final   07/17/2024 94.5 79.0 - 97.0 fL Final   07/16/2024 94.8 79.0 - 97.0 fL Final          Sodium Sodium   Date Value Ref Range Status   07/18/2024 134 (L) 136 - 145 mmol/L Final   07/17/2024 137 136 - 145 mmol/L Final   07/16/2024 137 136 - 145 mmol/L Final    " "  Potassium Potassium   Date Value Ref Range Status   07/18/2024 4.8 3.5 - 5.2 mmol/L Final   07/17/2024 4.2 3.5 - 5.2 mmol/L Final   07/17/2024 5.0 3.5 - 5.2 mmol/L Final   07/16/2024 5.9 (H) 3.5 - 5.2 mmol/L Final      Chloride Chloride   Date Value Ref Range Status   07/18/2024 93 (L) 98 - 107 mmol/L Final   07/17/2024 96 (L) 98 - 107 mmol/L Final   07/16/2024 95 (L) 98 - 107 mmol/L Final      CO2 CO2   Date Value Ref Range Status   07/18/2024 26.1 22.0 - 29.0 mmol/L Final   07/17/2024 27.3 22.0 - 29.0 mmol/L Final   07/16/2024 29.0 22.0 - 29.0 mmol/L Final      BUN BUN   Date Value Ref Range Status   07/18/2024 38 (H) 8 - 23 mg/dL Final   07/17/2024 31 (H) 8 - 23 mg/dL Final   07/16/2024 47 (H) 8 - 23 mg/dL Final      Creatinine Creatinine   Date Value Ref Range Status   07/18/2024 5.22 (H) 0.76 - 1.27 mg/dL Final   07/17/2024 5.32 (H) 0.76 - 1.27 mg/dL Final   07/16/2024 7.18 (H) 0.76 - 1.27 mg/dL Final      Calcium Calcium   Date Value Ref Range Status   07/18/2024 8.8 8.6 - 10.5 mg/dL Final   07/17/2024 8.4 (L) 8.6 - 10.5 mg/dL Final   07/16/2024 8.6 8.6 - 10.5 mg/dL Final      PO4 No results found for: \"CAPO4\"   Albumin Albumin   Date Value Ref Range Status   07/18/2024 4.0 3.5 - 5.2 g/dL Final   07/17/2024 3.8 3.5 - 5.2 g/dL Final      Magnesium Magnesium   Date Value Ref Range Status   07/18/2024 2.6 (H) 1.6 - 2.4 mg/dL Final   07/17/2024 2.6 (H) 1.6 - 2.4 mg/dL Final   07/16/2024 2.7 (H) 1.6 - 2.4 mg/dL Final      Uric Acid No results found for: \"URICACID\"        Results Review:     I reviewed the patient's new clinical results.    calcium acetate, 1,334 mg, Oral, TID With Meals  heparin (porcine), 5,000 Units, Subcutaneous, Q12H  hydrALAZINE, 100 mg, Oral, TID  sodium chloride, 10 mL, Intravenous, Q12H           Medication Review: Reviewed    Assessment & Plan       Hyperkalemia    HTN (hypertension)    ESRD on hemodialysis      Plan:   He received additional dialysis yesterday to get him back on his " usual, Monday Wednesday Friday schedule  BP at goal  Electrolytes and volume acceptable  Continue current BP regimen and phosphorus binders  He stable for discharge home today from a renal standpoint  Patient instructed to follow-up with outpatient dialysis center in a.m.      Win Berg MD  [facility]  07/18/24  14:08 EDT

## 2024-07-18 NOTE — PLAN OF CARE
Goal Outcome Evaluation:  Plan of Care Reviewed With: patient        Progress: improving  Outcome Evaluation: dc today

## 2024-07-18 NOTE — DISCHARGE SUMMARY
Date of Admission: 7/16/2024  Date of Discharge:  7/18/2024  Primary Care Physician: Freda Hall APRN     Discharge Diagnosis:  Active Hospital Problems    Diagnosis  POA    **Hyperkalemia [E87.5]  Yes    ESRD on hemodialysis [N18.6, Z99.2]  Not Applicable    HTN (hypertension) [I10]  Yes      Resolved Hospital Problems   No resolved problems to display.       Presenting Problem/History of Present Illness from H&P:  Hyperkalemia [E87.5]  Nonintractable headache, unspecified chronicity pattern, unspecified headache type [R51.9]   Mr. Olivarez is a 68 y.o. with a history of hypertension ESRD who presents to [facility] complaining of 3 days of acute headache that was intermittent.  He reported that it was stabbing and would come and go over the course of several hours.  He did not notice any fever or vision change.  No nausea or vomiting reported.  I saw him in dialysis and he is not having headache currently.  He was found to be hyperkalemic in the emergency room and nephrology was consulted.  He is now getting acute dialysis.     Hospital Course:  The patient is a 68 y.o. male who presented with intermittent headache for several days and ESRD with hyperkalemia.  He was admitted and underwent evaluation by nephrology and neurology services.  He was given urgent dialysis with improvement of his potassium level.  He did not have any acute findings on MRI and his headache resolved.  He is doing well now and is going to discharge home with continued outpatient dialysis.  Neurology did recommend consideration of transitioning off of hydralazine and onto an alternative antihypertensive medication if his outpatient headaches recur.  He has received the hydralazine here and is not having any ongoing headache so he is going to continue the medication for now.    Exam Today:  Constitutional:       General: He is not in acute distress.     Appearance: He is not diaphoretic.   Cardiovascular:      Rate and  Rhythm: Normal rate and regular rhythm.   Pulmonary:      Effort: Pulmonary effort is normal.      Breath sounds: No wheezing.   Abdominal:      Palpations: Abdomen is soft.      Tenderness: There is no abdominal tenderness. There is no guarding or rebound.   Musculoskeletal:         General: No tenderness.   Skin:     General: Skin is warm and dry.   Neurological:      Mental Status: He is alert. Mental status is at baseline.   Psychiatric:         Mood and Affect: Mood normal.         Behavior: Behavior normal.     Results:  CT Head  1. There are calcified atherosclerotic plaques in the intracranial  segments of the distal vertebral arteries and cavernous segments of the  internal carotid arteries bilaterally. Otherwise, this is a normal head  CT. The etiology of this patient's headaches is not established on this  exam.    MRI Brain  1. No acute intracranial abnormality is identified.     2. There are scattered tiny nodular foci of T2 high signal in the  frontal white matter consistent with very minimal small vessel disease  and there is mild posterior medial left sphenoid sinus mucosal  thickening. The remainder of the MRI of the brain is normal. The  etiology of this patient's new onset headaches is not established on  this exam.    Procedures Performed:         Consults:   Consults       Date and Time Order Name Status Description    7/16/2024  3:16 PM Inpatient Nephrology Consult Completed     7/16/2024  1:51 PM Inpatient Neurology Consult General Completed     7/16/2024 11:50 AM [facility] (on-call MD unless specified) Details      7/16/2024 11:23 AM Nephrology (on -call MD unless specified)               Discharge Disposition:  Home or Self Care    Discharge Medications:     Discharge Medications        Continue These Medications        Instructions Start Date   Baclofen 5 MG tablet  Commonly known as: LIORESAL   5 mg, Oral, Daily      calcium acetate 667 MG capsule capsule  Commonly known as: PHOS BINDER)   2,001  mg, Oral, 3 Times Daily      hydrALAZINE 50 MG tablet  Commonly known as: APRESOLINE   50 mg, Oral, 3 Times Daily      Senna Plus 8.6-50 MG per tablet  Generic drug: sennosides-docusate   2 tablets, Oral, Daily               Discharge Diet:   Diet Instructions       Diet: Renal Diets; Low Sodium (2-3g), Low Potassium, Low Phosphorus; Thin (IDDSI 0)      Discharge Diet: Renal Diets    Renal Diet:  Low Sodium (2-3g)  Low Potassium  Low Phosphorus       Fluid Consistency: Thin (IDDSI 0)            Activity at Discharge:   Activity Instructions       Activity as Tolerated              Follow-up Appointments:   Follow-up Information       Freda Hall APRN Follow up.    Specialty: Family Medicine  Contact information:  [address] [place], [place] [id number] [phone]                             Test Results Pending at Discharge:       Nicolás Sheldon MD  07/18/24  11:53 EDT    Time Spent on Discharge Activities: >30 minutes    Dictated portions using Dragon dictation software.

## 2024-07-18 NOTE — PROGRESS NOTES
Neurology Progress Note    Reason for visit  Follow up for headaches    Interval History  Patient reports no new symptoms overnight.  Brain MRI pending, then will receive dialysis.    Medications:  Hydralazine  Calcium acetate      Review of Systems:   Review of Systems   All other systems reviewed and are negative.    Vital Signs  Temp:  [97.3 °F (36.3 °C)-98.2 °F (36.8 °C)] 98.2 °F (36.8 °C)  Heart Rate:  [61-74] 71  Resp:  [16-18] 16  BP: (128-166)/(57-78) 128/64    Physical Exam:  General: appears comfortable  HEENT:  normal  CVS:  Regular rate and rhythm.    Musculoskeletal:  No signs of peripheral edema  Neurologic:   Alert oriented and fluent  Psychiatric: no anxiety     Results Review:    Brain MRI with and without contrast shows no abnormal enhancement or masses; there is mild chronic cerebrovascular disease    Medical Decision Making and Recommendations  Benign headache syndrome  Manage lifestyle: sleep, hydration    Consider effect of hydralazine; if headaches worsen, he may benefit from a change in BP medication    Neurology signing off, please call if needed further.    Flores Hoffman MD  07/17/24  20:41 EDT

## 2024-07-26 ENCOUNTER — APPOINTMENT (OUTPATIENT)
Dept: CARDIOLOGY | Facility: HOSPITAL | Age: 69
End: 2024-07-26
Payer: MEDICARE

## 2024-07-26 ENCOUNTER — APPOINTMENT (OUTPATIENT)
Dept: GENERAL RADIOLOGY | Facility: HOSPITAL | Age: 69
End: 2024-07-26
Payer: MEDICARE

## 2024-07-26 ENCOUNTER — HOSPITAL ENCOUNTER (EMERGENCY)
Facility: HOSPITAL | Age: 69
Discharge: HOME | End: 2024-07-26
Attending: EMERGENCY MEDICINE
Payer: MEDICARE

## 2024-07-26 VITALS
RESPIRATION RATE: 18 BRPM | WEIGHT: 218.26 LBS | TEMPERATURE: 98.5 F | DIASTOLIC BLOOD PRESSURE: 71 MMHG | OXYGEN SATURATION: 99 % | HEIGHT: 70 IN | SYSTOLIC BLOOD PRESSURE: 155 MMHG | HEART RATE: 69 BPM | BODY MASS INDEX: 31.25 KG/M2

## 2024-07-26 DIAGNOSIS — R00.2 PALPITATIONS: ICD-10-CM

## 2024-07-26 DIAGNOSIS — R07.9 CHEST PAIN, UNSPECIFIED TYPE: Primary | ICD-10-CM

## 2024-07-26 DIAGNOSIS — H93.13 TINNITUS OF BOTH EARS: ICD-10-CM

## 2024-07-26 LAB
ALBUMIN SERPL-MCNC: 4.2 G/DL (ref 3.5–5.2)
ALBUMIN/GLOB SERPL: 1.2 G/DL
ALP SERPL-CCNC: 107 U/L (ref 39–117)
ALT SERPL W P-5'-P-CCNC: 17 U/L (ref 1–41)
ANION GAP SERPL CALCULATED.3IONS-SCNC: 16 MMOL/L (ref 5–15)
AST SERPL-CCNC: 10 U/L (ref 1–40)
BASOPHILS # BLD AUTO: 0.05 10*3/MM3 (ref 0–0.2)
BASOPHILS NFR BLD AUTO: 0.5 % (ref 0–1.5)
BILIRUB SERPL-MCNC: 0.3 MG/DL (ref 0–1.2)
BUN SERPL-MCNC: 31 MG/DL (ref 8–23)
BUN/CREAT SERPL: 6.1 (ref 7–25)
CALCIUM SPEC-SCNC: 9 MG/DL (ref 8.6–10.5)
CHLORIDE SERPL-SCNC: 90 MMOL/L (ref 98–107)
CO2 SERPL-SCNC: 26 MMOL/L (ref 22–29)
CREAT SERPL-MCNC: 5.09 MG/DL (ref 0.76–1.27)
D DIMER PPP FEU-MCNC: 0.52 MCGFEU/ML (ref 0–0.66)
DEPRECATED RDW RBC AUTO: 45.6 FL (ref 37–54)
EGFRCR SERPLBLD CKD-EPI 2021: 11.8 ML/MIN/1.73
EOSINOPHIL # BLD AUTO: 0.16 10*3/MM3 (ref 0–0.4)
EOSINOPHIL NFR BLD AUTO: 1.6 % (ref 0.3–6.2)
ERYTHROCYTE [DISTWIDTH] IN BLOOD BY AUTOMATED COUNT: 13.1 % (ref 12.3–15.4)
GEN 5 2HR TROPONIN T REFLEX: 43 NG/L
GLOBULIN UR ELPH-MCNC: 3.6 GM/DL
GLUCOSE SERPL-MCNC: 97 MG/DL (ref 65–99)
HCT VFR BLD AUTO: 39 % (ref 37.5–51)
HGB BLD-MCNC: 13.2 G/DL (ref 13–17.7)
HOLD SPECIMEN: NORMAL
HOLD SPECIMEN: NORMAL
IMM GRANULOCYTES # BLD AUTO: 0.06 10*3/MM3 (ref 0–0.05)
IMM GRANULOCYTES NFR BLD AUTO: 0.6 % (ref 0–0.5)
LYMPHOCYTES # BLD AUTO: 2.4 10*3/MM3 (ref 0.7–3.1)
LYMPHOCYTES NFR BLD AUTO: 24.5 % (ref 19.6–45.3)
MCH RBC QN AUTO: 32.4 PG (ref 26.6–33)
MCHC RBC AUTO-ENTMCNC: 33.8 G/DL (ref 31.5–35.7)
MCV RBC AUTO: 95.8 FL (ref 79–97)
MONOCYTES # BLD AUTO: 1.21 10*3/MM3 (ref 0.1–0.9)
MONOCYTES NFR BLD AUTO: 12.4 % (ref 5–12)
NEUTROPHILS NFR BLD AUTO: 5.9 10*3/MM3 (ref 1.7–7)
NEUTROPHILS NFR BLD AUTO: 60.4 % (ref 42.7–76)
NRBC BLD AUTO-RTO: 0 /100 WBC (ref 0–0.2)
PLATELET # BLD AUTO: 231 10*3/MM3 (ref 140–450)
PMV BLD AUTO: 9 FL (ref 6–12)
POTASSIUM SERPL-SCNC: 3.6 MMOL/L (ref 3.5–5.2)
PROT SERPL-MCNC: 7.8 G/DL (ref 6–8.5)
QT INTERVAL: 425 MS
QTC INTERVAL: 491 MS
RBC # BLD AUTO: 4.07 10*6/MM3 (ref 4.14–5.8)
SODIUM SERPL-SCNC: 132 MMOL/L (ref 136–145)
TROPONIN T DELTA: -2 NG/L
TROPONIN T SERPL HS-MCNC: 45 NG/L
WBC NRBC COR # BLD AUTO: 9.78 10*3/MM3 (ref 3.4–10.8)
WHOLE BLOOD HOLD COAG: NORMAL
WHOLE BLOOD HOLD SPECIMEN: NORMAL

## 2024-07-26 PROCEDURE — 85379 FIBRIN DEGRADATION QUANT: CPT | Performed by: EMERGENCY MEDICINE

## 2024-07-26 PROCEDURE — 93246 EXT ECG>7D<15D RECORDING: CPT

## 2024-07-26 PROCEDURE — 93010 ELECTROCARDIOGRAM REPORT: CPT | Performed by: INTERNAL MEDICINE

## 2024-07-26 PROCEDURE — 71045 X-RAY EXAM CHEST 1 VIEW: CPT

## 2024-07-26 PROCEDURE — 80053 COMPREHEN METABOLIC PANEL: CPT | Performed by: EMERGENCY MEDICINE

## 2024-07-26 PROCEDURE — 85025 COMPLETE CBC W/AUTO DIFF WBC: CPT | Performed by: EMERGENCY MEDICINE

## 2024-07-26 PROCEDURE — 84484 ASSAY OF TROPONIN QUANT: CPT | Performed by: EMERGENCY MEDICINE

## 2024-07-26 PROCEDURE — 93005 ELECTROCARDIOGRAM TRACING: CPT

## 2024-07-26 PROCEDURE — 36415 COLL VENOUS BLD VENIPUNCTURE: CPT

## 2024-07-26 PROCEDURE — 99284 EMERGENCY DEPT VISIT MOD MDM: CPT

## 2024-07-26 PROCEDURE — 93005 ELECTROCARDIOGRAM TRACING: CPT | Performed by: EMERGENCY MEDICINE

## 2024-07-26 RX ORDER — ASPIRIN 325 MG
325 TABLET ORAL ONCE
Status: DISCONTINUED | OUTPATIENT
Start: 2024-07-26 | End: 2024-07-26

## 2024-07-26 RX ORDER — SODIUM CHLORIDE 0.9 % (FLUSH) 0.9 %
10 SYRINGE (ML) INJECTION AS NEEDED
Status: DISCONTINUED | OUTPATIENT
Start: 2024-07-26 | End: 2024-07-26 | Stop reason: HOSPADM

## 2024-07-26 NOTE — ED PROVIDER NOTES
EMERGENCY DEPARTMENT ENCOUNTER    Room Number:  18/18  PCP: Freda Hall APRN    HPI:  Chief Complaint: Multiple complaints  A complete HPI/ROS/PMH/PSH/SH/FH are unobtainable due to: None  Context: Adryan Olivarez is a 68 y.o. male who presents to the ED c/o acute multiple complaints.  He complains of having some chest tightness that began about 1 hour ago.  He states that he felt sweaty get short of breath.  He also has had tingling to his head and feels ringing in his ears.  He states that he feels like his heart rate drops, particularly when he is going to the bathroom.  He himself is a very poor historian and his son provides the most clarity in terms of his symptoms as the patient  is rather tangential.        PAST MEDICAL HISTORY  Active Ambulatory Problems     Diagnosis Date Noted    MARGARITA (acute kidney injury) 02/24/2021    History of stroke 02/24/2021    HTN (hypertension) 02/24/2021    Anxiety disorder 02/24/2021    Hypertension 02/24/2021    ESRD on hemodialysis 02/27/2021    Uremia 07/13/2023    Anemia 07/14/2023    Noncompliance of patient with renal dialysis 07/14/2023    Hyperkalemia 07/16/2024     Resolved Ambulatory Problems     Diagnosis Date Noted    CKD (chronic kidney disease) stage 3, GFR 30-59 ml/min 02/24/2021    Hyperkalemia 02/24/2021     Past Medical History:   Diagnosis Date    Anxiety     Chronic kidney disease     Stroke 1989         PAST SURGICAL HISTORY  Past Surgical History:   Procedure Laterality Date    INSERTION HEMODIALYSIS CATHETER Right 2/27/2021    Procedure: TUNNELED DIAYLIS CATHETER PLACEMENT;  Surgeon: Fernando Bazzi MD;  Location: [facility];  Service: Vascular;  Laterality: Right;         FAMILY HISTORY  History reviewed. No pertinent family history.      SOCIAL HISTORY  Social History     Socioeconomic History    Marital status:    Tobacco Use    Smoking status: Never    Smokeless tobacco: Never   Vaping Use    Vaping status: Never Used   Substance  and Sexual Activity    Alcohol use: No    Drug use: No    Sexual activity: Not Currently         ALLERGIES  Patient has no known allergies.        REVIEW OF SYSTEMS  Review of Systems     Included in HPI  All systems reviewed and negative except for those discussed in HPI.       PHYSICAL EXAM  ED Triage Vitals   Temp Heart Rate Resp BP SpO2   07/26/24 1451 07/26/24 1451 07/26/24 1451 07/26/24 1513 07/26/24 1451   98.5 °F (36.9 °C) 82 22 124/70 99 %      Temp src Heart Rate Source Patient Position BP Location FiO2 (%)   07/26/24 1451 07/26/24 1451 -- -- --   Tympanic Monitor          Physical Exam      GENERAL: no acute distress  HENT: nares patent  EYES: no scleral icterus  CV: regular rhythm, normal rate  RESPIRATORY: normal effort, clear to auscultation bilaterally  ABDOMEN: soft, nontender  MUSCULOSKELETAL: no deformity, no lower extremity edema or tenderness  NEURO: alert, moves all extremities, follows commands  PSYCH:  anxious affect  SKIN: warm, dry    Vital signs and nursing notes reviewed.          LAB RESULTS  Recent Results (from the past 24 hour(s))   ECG 12 Lead ED Triage Standing Order; Chest Pain    Collection Time: 07/26/24  2:57 PM   Result Value Ref Range    QT Interval 425 ms    QTC Interval 491 ms   Comprehensive Metabolic Panel    Collection Time: 07/26/24  3:02 PM    Specimen: Blood   Result Value Ref Range    Glucose 97 65 - 99 mg/dL    BUN 31 (H) 8 - 23 mg/dL    Creatinine 5.09 (H) 0.76 - 1.27 mg/dL    Sodium 132 (L) 136 - 145 mmol/L    Potassium 3.6 3.5 - 5.2 mmol/L    Chloride 90 (L) 98 - 107 mmol/L    CO2 26.0 22.0 - 29.0 mmol/L    Calcium 9.0 8.6 - 10.5 mg/dL    Total Protein 7.8 6.0 - 8.5 g/dL    Albumin 4.2 3.5 - 5.2 g/dL    ALT (SGPT) 17 1 - 41 U/L    AST (SGOT) 10 1 - 40 U/L    Alkaline Phosphatase 107 39 - 117 U/L    Total Bilirubin 0.3 0.0 - 1.2 mg/dL    Globulin 3.6 gm/dL    A/G Ratio 1.2 g/dL    BUN/Creatinine Ratio 6.1 (L) 7.0 - 25.0    Anion Gap 16.0 (H) 5.0 - 15.0 mmol/L     eGFR 11.8 (L) >60.0 mL/min/1.73   High Sensitivity Troponin T    Collection Time: 07/26/24  3:02 PM    Specimen: Blood   Result Value Ref Range    HS Troponin T 45 (H) <22 ng/L   Green Top (Gel)    Collection Time: 07/26/24  3:02 PM   Result Value Ref Range    Extra Tube Hold for add-ons.    Lavender Top    Collection Time: 07/26/24  3:02 PM   Result Value Ref Range    Extra Tube hold for add-on    Gold Top - SST    Collection Time: 07/26/24  3:02 PM   Result Value Ref Range    Extra Tube Hold for add-ons.    Light Blue Top    Collection Time: 07/26/24  3:02 PM   Result Value Ref Range    Extra Tube Hold for add-ons.    CBC Auto Differential    Collection Time: 07/26/24  3:02 PM    Specimen: Blood   Result Value Ref Range    WBC 9.78 3.40 - 10.80 10*3/mm3    RBC 4.07 (L) 4.14 - 5.80 10*6/mm3    Hemoglobin 13.2 13.0 - 17.7 g/dL    Hematocrit 39.0 37.5 - 51.0 %    MCV 95.8 79.0 - 97.0 fL    MCH 32.4 26.6 - 33.0 pg    MCHC 33.8 31.5 - 35.7 g/dL    RDW 13.1 12.3 - 15.4 %    RDW-SD 45.6 37.0 - 54.0 fl    MPV 9.0 6.0 - 12.0 fL    Platelets 231 140 - 450 10*3/mm3    Neutrophil % 60.4 42.7 - 76.0 %    Lymphocyte % 24.5 19.6 - 45.3 %    Monocyte % 12.4 (H) 5.0 - 12.0 %    Eosinophil % 1.6 0.3 - 6.2 %    Basophil % 0.5 0.0 - 1.5 %    Immature Grans % 0.6 (H) 0.0 - 0.5 %    Neutrophils, Absolute 5.90 1.70 - 7.00 10*3/mm3    Lymphocytes, Absolute 2.40 0.70 - 3.10 10*3/mm3    Monocytes, Absolute 1.21 (H) 0.10 - 0.90 10*3/mm3    Eosinophils, Absolute 0.16 0.00 - 0.40 10*3/mm3    Basophils, Absolute 0.05 0.00 - 0.20 10*3/mm3    Immature Grans, Absolute 0.06 (H) 0.00 - 0.05 10*3/mm3    nRBC 0.0 0.0 - 0.2 /100 WBC   D-dimer, Quantitative    Collection Time: 07/26/24  3:02 PM    Specimen: Blood   Result Value Ref Range    D-Dimer, Quantitative 0.52 0.00 - 0.66 MCGFEU/mL   High Sensitivity Troponin T 2Hr    Collection Time: 07/26/24  5:05 PM    Specimen: Blood   Result Value Ref Range    HS Troponin T 43 (H) <22 ng/L    Troponin T  Delta -2 >=-4 - <+4 ng/L       Ordered the above labs and reviewed the results.        RADIOLOGY  XR Chest 1 View    Result Date: 7/26/2024  XR CHEST 1 VW-7/26/2024  HISTORY: Chest pain.  Heart size is at the upper limits of normal. Lungs appear free of acute infiltrates. Bones and soft tissues are unremarkable.      1. Borderline cardiomegaly.   This report was finalized on 7/26/2024 3:33 PM by Dr. Erasto Fowler M.D on Workstation: [id number]       Ordered the above noted radiological studies. Reviewed by me in PACS.        MEDICATIONS GIVEN IN ER  Medications   sodium chloride 0.9 % flush 10 mL (has no administration in time range)         ORDERS PLACED DURING THIS VISIT:  Orders Placed This Encounter   Procedures    XR Chest 1 View    [place] Draw    Comprehensive Metabolic Panel    High Sensitivity Troponin T    CBC Auto Differential    High Sensitivity Troponin T 2Hr    D-dimer, Quantitative    NPO Diet NPO Type: Strict NPO    Undress & Gown    Continuous Pulse Oximetry    Monitor Blood Pressure    Oxygen Therapy- Nasal Cannula; Titrate 1-6 LPM Per SpO2; 90 - 95%    Holter Monitor - 72 Hour Up To 15 Days    ECG 12 Lead ED Triage Standing Order; Chest Pain    ECG 12 Lead ED Triage Standing Order; Chest Pain    Insert Peripheral IV    CBC & Differential    Green Top (Gel)    Lavender Top    Gold Top - SST    Light Blue Top         OUTPATIENT MEDICATION MANAGEMENT:  Current Facility-Administered Medications Ordered in Epic   Medication Dose Route Frequency Provider Last Rate Last Admin    sodium chloride 0.9 % flush 10 mL  10 mL Intravenous PRN Fernando Dias II, MD         Current Outpatient Medications Ordered in Epic   Medication Sig Dispense Refill    Baclofen (LIORESAL) 5 MG tablet Take 1 tablet by mouth Daily.      calcium acetate (PHOS BINDER,) 667 MG capsule capsule Take 3 capsules by mouth 3 (Three) Times a Day.      hydrALAZINE (APRESOLINE) 50 MG tablet Take 1 tablet by mouth 3 (Three) Times a  Day. 90 tablet 1    sennosides-docusate (Senna Plus) 8.6-50 MG per tablet Take 2 tablets by mouth Daily.         PROCEDURES  Procedures          MEDICAL DECISION MAKING, PROGRESS, and CONSULTS    Discussion below represents my analysis of pertinent findings related to patient's condition, differential diagnosis, treatment plan and final disposition.            Differential diagnosis:    Migraine, intracranial mass, ACS, PE, pneumothorax, pneumonia, anxiety             Independent interpretation of labs, radiology studies, and discussions with consultants:  ED Course as of 07/26/24 1822   Fri Jul 26, 2024   1503 EKG independently interpreted by myself.  Time 2:57 PM.  Sinus rhythm.  Heart rate 80.  LAFB.  LVH.  No acute ST abnormality. [TD]   1536 HS Troponin T(!): 45 [TD]   1646 D-Dimer, Quant: 0.52 [TD]   1728 On medical chart review, reviewed the patient's most recent hospitalization.  I reviewed the MRI from 7/17/2024.  MRI was acutely negative.  Patient was seen by Dr. Hoffman with neurology he was thought to have benign headache.  Consider effective hydralazine. [TD]   1730 Troponin T Delta: -2 [TD]      ED Course User Index  [TD] Fernando Dias II, MD       Given the patient's concern for his lower heart rate which she feels at times, I will order a Zio patch for him.  He has not had any episodes of bradycardia here in the ER.        DIAGNOSIS  Final diagnoses:   Chest pain, unspecified type   Tinnitus of both ears   Palpitations         DISPOSITION  DISCHARGE    FOLLOW-UP  Freda Hall, APRN  [address] [id number] [phone]    Schedule an appointment as soon as possible for a visit in 3 days      [facility] NEUROLOGY  [address] [place] [id number] [phone]  Schedule an appointment as soon as possible for a visit   As needed    [facility] EMERGENCY DEPARTMENT  [address] [facility]  [facility] [address] [phone]  Go to   If symptoms worsen         Medication List      No changes were made to your prescriptions during this visit.             Latest Documented Vital Signs:  As of 18:22 EDT  BP- 155/71 HR- 69 Temp- 98.5 °F (36.9 °C) (Tympanic) O2 sat- 99%      --    Please note that portions of this were completed with a voice recognition program.       Note Disclaimer: At [facility], we believe that sharing information builds trust and better relationships. You are receiving this note because you are receiving care at [facility] or recently visited. It is possible you will see health information before a provider has talked with you about it. This kind of information can be easy to misunderstand. To help you fully understand what it means for your health, we urge you to discuss this note with your provider.         Fernando Dias II, MD  07/26/24 2077

## 2024-07-26 NOTE — Clinical Note
[facility] EMERGENCY DEPARTMENT  [address] [facility] [address]  Phone: [phone]    José Olivarez accompanied Adryan Olivarez to the emergency department on 7/26/2024. They may return to work on 07/27/2024.        Thank you for choosing [facility].    Fernando Dias II, MD

## 2024-07-31 ENCOUNTER — HOSPITAL ENCOUNTER (EMERGENCY)
Facility: HOSPITAL | Age: 69
Discharge: HOME | End: 2024-07-31
Attending: EMERGENCY MEDICINE
Payer: MEDICARE

## 2024-07-31 VITALS
SYSTOLIC BLOOD PRESSURE: 165 MMHG | WEIGHT: 214.95 LBS | OXYGEN SATURATION: 97 % | BODY MASS INDEX: 30.77 KG/M2 | TEMPERATURE: 97.6 F | HEIGHT: 70 IN | DIASTOLIC BLOOD PRESSURE: 74 MMHG | RESPIRATION RATE: 14 BRPM | HEART RATE: 65 BPM

## 2024-07-31 DIAGNOSIS — I10 HYPERTENSION, UNSPECIFIED TYPE: ICD-10-CM

## 2024-07-31 DIAGNOSIS — Z99.2 ESRD ON HEMODIALYSIS: ICD-10-CM

## 2024-07-31 DIAGNOSIS — R53.1 GENERALIZED WEAKNESS: Primary | ICD-10-CM

## 2024-07-31 DIAGNOSIS — N18.6 ESRD ON HEMODIALYSIS: ICD-10-CM

## 2024-07-31 LAB
ALBUMIN SERPL-MCNC: 3.6 G/DL (ref 3.5–5.2)
ALBUMIN/GLOB SERPL: 1.2 G/DL
ALP SERPL-CCNC: 84 U/L (ref 39–117)
ALT SERPL W P-5'-P-CCNC: 15 U/L (ref 1–41)
ANION GAP SERPL CALCULATED.3IONS-SCNC: 15 MMOL/L (ref 5–15)
APTT PPP: 30.6 SECONDS (ref 22.7–35.4)
AST SERPL-CCNC: 10 U/L (ref 1–40)
B PARAPERT DNA SPEC QL NAA+PROBE: NOT DETECTED
B PERT DNA SPEC QL NAA+PROBE: NOT DETECTED
BASOPHILS # BLD AUTO: 0.03 10*3/MM3 (ref 0–0.2)
BASOPHILS NFR BLD AUTO: 0.3 % (ref 0–1.5)
BILIRUB SERPL-MCNC: 0.4 MG/DL (ref 0–1.2)
BUN SERPL-MCNC: 86 MG/DL (ref 8–23)
BUN/CREAT SERPL: 11.4 (ref 7–25)
C PNEUM DNA NPH QL NAA+NON-PROBE: NOT DETECTED
CALCIUM SPEC-SCNC: 8.5 MG/DL (ref 8.6–10.5)
CHLORIDE SERPL-SCNC: 101 MMOL/L (ref 98–107)
CO2 SERPL-SCNC: 23 MMOL/L (ref 22–29)
CREAT SERPL-MCNC: 7.54 MG/DL (ref 0.76–1.27)
DEPRECATED RDW RBC AUTO: 43.4 FL (ref 37–54)
EGFRCR SERPLBLD CKD-EPI 2021: 7.3 ML/MIN/1.73
EOSINOPHIL # BLD AUTO: 0.14 10*3/MM3 (ref 0–0.4)
EOSINOPHIL NFR BLD AUTO: 1.5 % (ref 0.3–6.2)
ERYTHROCYTE [DISTWIDTH] IN BLOOD BY AUTOMATED COUNT: 12.6 % (ref 12.3–15.4)
FLUAV SUBTYP SPEC NAA+PROBE: NOT DETECTED
FLUBV RNA ISLT QL NAA+PROBE: NOT DETECTED
GLOBULIN UR ELPH-MCNC: 3.1 GM/DL
GLUCOSE SERPL-MCNC: 87 MG/DL (ref 65–99)
HADV DNA SPEC NAA+PROBE: NOT DETECTED
HCOV 229E RNA SPEC QL NAA+PROBE: NOT DETECTED
HCOV HKU1 RNA SPEC QL NAA+PROBE: NOT DETECTED
HCOV NL63 RNA SPEC QL NAA+PROBE: NOT DETECTED
HCOV OC43 RNA SPEC QL NAA+PROBE: NOT DETECTED
HCT VFR BLD AUTO: 33.6 % (ref 37.5–51)
HGB BLD-MCNC: 11.2 G/DL (ref 13–17.7)
HMPV RNA NPH QL NAA+NON-PROBE: NOT DETECTED
HPIV1 RNA ISLT QL NAA+PROBE: NOT DETECTED
HPIV2 RNA SPEC QL NAA+PROBE: NOT DETECTED
HPIV3 RNA NPH QL NAA+PROBE: NOT DETECTED
HPIV4 P GENE NPH QL NAA+PROBE: NOT DETECTED
IMM GRANULOCYTES # BLD AUTO: 0.04 10*3/MM3 (ref 0–0.05)
IMM GRANULOCYTES NFR BLD AUTO: 0.4 % (ref 0–0.5)
INR PPP: 1.07 (ref 0.9–1.1)
LYMPHOCYTES # BLD AUTO: 1.89 10*3/MM3 (ref 0.7–3.1)
LYMPHOCYTES NFR BLD AUTO: 19.9 % (ref 19.6–45.3)
M PNEUMO IGG SER IA-ACNC: NOT DETECTED
MAGNESIUM SERPL-MCNC: 2.7 MG/DL (ref 1.6–2.4)
MCH RBC QN AUTO: 32 PG (ref 26.6–33)
MCHC RBC AUTO-ENTMCNC: 33.3 G/DL (ref 31.5–35.7)
MCV RBC AUTO: 96 FL (ref 79–97)
MONOCYTES # BLD AUTO: 0.8 10*3/MM3 (ref 0.1–0.9)
MONOCYTES NFR BLD AUTO: 8.4 % (ref 5–12)
NEUTROPHILS NFR BLD AUTO: 6.6 10*3/MM3 (ref 1.7–7)
NEUTROPHILS NFR BLD AUTO: 69.5 % (ref 42.7–76)
NRBC BLD AUTO-RTO: 0 /100 WBC (ref 0–0.2)
NT-PROBNP SERPL-MCNC: 5995 PG/ML (ref 0–900)
PHOSPHATE SERPL-MCNC: 4.8 MG/DL (ref 2.5–4.5)
PLATELET # BLD AUTO: 213 10*3/MM3 (ref 140–450)
PMV BLD AUTO: 9.3 FL (ref 6–12)
POTASSIUM SERPL-SCNC: 4.2 MMOL/L (ref 3.5–5.2)
PROT SERPL-MCNC: 6.7 G/DL (ref 6–8.5)
PROTHROMBIN TIME: 14.1 SECONDS (ref 11.7–14.2)
RBC # BLD AUTO: 3.5 10*6/MM3 (ref 4.14–5.8)
RHINOVIRUS RNA SPEC NAA+PROBE: NOT DETECTED
RSV RNA NPH QL NAA+NON-PROBE: NOT DETECTED
SARS-COV-2 RNA NPH QL NAA+NON-PROBE: NOT DETECTED
SODIUM SERPL-SCNC: 139 MMOL/L (ref 136–145)
TROPONIN T SERPL HS-MCNC: 45 NG/L
WBC NRBC COR # BLD AUTO: 9.5 10*3/MM3 (ref 3.4–10.8)

## 2024-07-31 PROCEDURE — 83880 ASSAY OF NATRIURETIC PEPTIDE: CPT | Performed by: EMERGENCY MEDICINE

## 2024-07-31 PROCEDURE — 99283 EMERGENCY DEPT VISIT LOW MDM: CPT

## 2024-07-31 PROCEDURE — 93010 ELECTROCARDIOGRAM REPORT: CPT | Performed by: INTERNAL MEDICINE

## 2024-07-31 PROCEDURE — 80053 COMPREHEN METABOLIC PANEL: CPT | Performed by: EMERGENCY MEDICINE

## 2024-07-31 PROCEDURE — 83735 ASSAY OF MAGNESIUM: CPT | Performed by: EMERGENCY MEDICINE

## 2024-07-31 PROCEDURE — 84100 ASSAY OF PHOSPHORUS: CPT | Performed by: EMERGENCY MEDICINE

## 2024-07-31 PROCEDURE — 85610 PROTHROMBIN TIME: CPT | Performed by: EMERGENCY MEDICINE

## 2024-07-31 PROCEDURE — 85025 COMPLETE CBC W/AUTO DIFF WBC: CPT | Performed by: EMERGENCY MEDICINE

## 2024-07-31 PROCEDURE — 84484 ASSAY OF TROPONIN QUANT: CPT | Performed by: EMERGENCY MEDICINE

## 2024-07-31 PROCEDURE — 0202U NFCT DS 22 TRGT SARS-COV-2: CPT | Performed by: EMERGENCY MEDICINE

## 2024-07-31 PROCEDURE — 93005 ELECTROCARDIOGRAM TRACING: CPT | Performed by: EMERGENCY MEDICINE

## 2024-07-31 PROCEDURE — 85730 THROMBOPLASTIN TIME PARTIAL: CPT | Performed by: EMERGENCY MEDICINE

## 2024-07-31 RX ORDER — SODIUM CHLORIDE 0.9 % (FLUSH) 0.9 %
10 SYRINGE (ML) INJECTION AS NEEDED
Status: DISCONTINUED | OUTPATIENT
Start: 2024-07-31 | End: 2024-07-31 | Stop reason: HOSPADM

## 2024-07-31 RX ORDER — CARVEDILOL PHOSPHATE 10 MG/1
12.5 CAPSULE, EXTENDED RELEASE ORAL DAILY
COMMUNITY

## 2024-07-31 NOTE — DISCHARGE INSTRUCTIONS
Take any medication prescribed as instructed. Go to dialysis this morning.    Monitor for any signs of recurrent symptoms or worsening and see your primary doctor to discuss your ER visit while returning to the ER if any concerns as we discussed.

## 2024-07-31 NOTE — ED PROVIDER NOTES
EMERGENCY DEPARTMENT ENCOUNTER    Room Number:  11/11  PCP: Freda Hall APRN  Independent Historians: Patient and Family    HPI:  Chief Complaint: had concerns including Weakness - Generalized.      A complete HPI/ROS/PMH/PSH/SH/FH are unobtainable due to: None    Chronic or social conditions impacting patient care (Social Determinants of Health): None      Context: Adryan Olivarez is a 68 y.o. male with a medical history of end-stage renal disease on dialysis, hypertension, anxiety who presents to the ED c/o acute generalized weakness and frequent headaches with elevated blood pressure over the past several weeks.  Patient had a hospitalization for same and recently seen in the ER for same.  Patient had normal dialysis session on Monday and is on a normal Monday Wednesday Friday schedule.  Patient reporting dry mouth and sore throat along with decreased appetite and chills.  Patient denies any vomiting, diarrhea, shortness of breath, chest pain.        Review of prior external notes (non-ED) -and- Review of prior external test results outside of this encounter: I reviewed discharge summary from recent admission.  Patient was admitted from July 16 to July 18 for his blood pressure complaining of 3 days of headache.  Patient found to be hyperkalemic in the ER so was admitted to have dialysis.  Patient had an MRI of his brain that was negative.  Neurology recommended changing from hydralazine to another agent.  However, patient had received hydralazine in the hospital with headache resolved so the medication was continued.    Prescription drug monitoring program review:     N/A    PAST MEDICAL HISTORY  Active Ambulatory Problems     Diagnosis Date Noted    MARGARITA (acute kidney injury) 02/24/2021    History of stroke 02/24/2021    HTN (hypertension) 02/24/2021    Anxiety disorder 02/24/2021    Hypertension 02/24/2021    ESRD on hemodialysis 02/27/2021    Uremia 07/13/2023    Anemia 07/14/2023    Noncompliance of  patient with renal dialysis 07/14/2023    Hyperkalemia 07/16/2024     Resolved Ambulatory Problems     Diagnosis Date Noted    CKD (chronic kidney disease) stage 3, GFR 30-59 ml/min 02/24/2021    Hyperkalemia 02/24/2021     Past Medical History:   Diagnosis Date    Anxiety     Chronic kidney disease     Stroke 1989         PAST SURGICAL HISTORY  Past Surgical History:   Procedure Laterality Date    INSERTION HEMODIALYSIS CATHETER Right 2/27/2021    Procedure: TUNNELED DIAYLIS CATHETER PLACEMENT;  Surgeon: Fernando Bazzi MD;  Location: [facility];  Service: Vascular;  Laterality: Right;         FAMILY HISTORY  History reviewed. No pertinent family history.      SOCIAL HISTORY  Social History     Socioeconomic History    Marital status:    Tobacco Use    Smoking status: Never    Smokeless tobacco: Never   Vaping Use    Vaping status: Never Used   Substance and Sexual Activity    Alcohol use: No    Drug use: No    Sexual activity: Not Currently         ALLERGIES  Patient has no known allergies.        REVIEW OF SYSTEMS  Review of Systems  Included in HPI  All systems reviewed and negative except for those discussed in HPI.      PHYSICAL EXAM    I have reviewed the triage vital signs and nursing notes.    ED Triage Vitals   Temp Heart Rate Resp BP SpO2   07/31/24 0323 07/31/24 0323 07/31/24 0323 07/31/24 0335 07/31/24 0323   97.6 °F (36.4 °C) 65 14 165/74 97 %      Temp src Heart Rate Source Patient Position BP Location FiO2 (%)   07/31/24 0323 07/31/24 0323 -- -- --   Tympanic Monitor          Physical Exam  GENERAL: Pleasant cooperative conversant  male, obese, alert, no acute distress  SKIN: Warm, dry  HENT: Normocephalic, atraumatic  EYES: no scleral icterus  CV: regular rhythm, regular rate  RESPIRATORY: normal effort, lungs clear, no wheezing, no rhonchi  ABDOMEN: soft, nontender, nondistended  MUSCULOSKELETAL: no deformity, left upper extremity AV fistula with palpable thrill, no lower  extremity edema, no calf tenderness to palpation  NEURO: alert, moves all extremities, follows commands                                                                   LAB RESULTS  Recent Results (from the past 24 hour(s))   ECG 12 Lead Other; weakness    Collection Time: 07/31/24  3:40 AM   Result Value Ref Range    QT Interval 478 ms    QTC Interval 482 ms   Comprehensive Metabolic Panel    Collection Time: 07/31/24  3:51 AM    Specimen: Blood   Result Value Ref Range    Glucose 87 65 - 99 mg/dL    BUN 86 (H) 8 - 23 mg/dL    Creatinine 7.54 (H) 0.76 - 1.27 mg/dL    Sodium 139 136 - 145 mmol/L    Potassium 4.2 3.5 - 5.2 mmol/L    Chloride 101 98 - 107 mmol/L    CO2 23.0 22.0 - 29.0 mmol/L    Calcium 8.5 (L) 8.6 - 10.5 mg/dL    Total Protein 6.7 6.0 - 8.5 g/dL    Albumin 3.6 3.5 - 5.2 g/dL    ALT (SGPT) 15 1 - 41 U/L    AST (SGOT) 10 1 - 40 U/L    Alkaline Phosphatase 84 39 - 117 U/L    Total Bilirubin 0.4 0.0 - 1.2 mg/dL    Globulin 3.1 gm/dL    A/G Ratio 1.2 g/dL    BUN/Creatinine Ratio 11.4 7.0 - 25.0    Anion Gap 15.0 5.0 - 15.0 mmol/L    eGFR 7.3 (L) >60.0 mL/min/1.73   Protime-INR    Collection Time: 07/31/24  3:51 AM    Specimen: Blood   Result Value Ref Range    Protime 14.1 11.7 - 14.2 Seconds    INR 1.07 0.90 - 1.10   aPTT    Collection Time: 07/31/24  3:51 AM    Specimen: Blood   Result Value Ref Range    PTT 30.6 22.7 - 35.4 seconds   BNP    Collection Time: 07/31/24  3:51 AM    Specimen: Blood   Result Value Ref Range    proBNP 5,995.0 (H) 0.0 - 900.0 pg/mL   High Sensitivity Troponin T    Collection Time: 07/31/24  3:51 AM    Specimen: Blood   Result Value Ref Range    HS Troponin T 45 (H) <22 ng/L   Magnesium    Collection Time: 07/31/24  3:51 AM    Specimen: Blood   Result Value Ref Range    Magnesium 2.7 (H) 1.6 - 2.4 mg/dL   Phosphorus    Collection Time: 07/31/24  3:51 AM    Specimen: Blood   Result Value Ref Range    Phosphorus 4.8 (H) 2.5 - 4.5 mg/dL   CBC Auto Differential    Collection Time:  07/31/24  3:51 AM    Specimen: Blood   Result Value Ref Range    WBC 9.50 3.40 - 10.80 10*3/mm3    RBC 3.50 (L) 4.14 - 5.80 10*6/mm3    Hemoglobin 11.2 (L) 13.0 - 17.7 g/dL    Hematocrit 33.6 (L) 37.5 - 51.0 %    MCV 96.0 79.0 - 97.0 fL    MCH 32.0 26.6 - 33.0 pg    MCHC 33.3 31.5 - 35.7 g/dL    RDW 12.6 12.3 - 15.4 %    RDW-SD 43.4 37.0 - 54.0 fl    MPV 9.3 6.0 - 12.0 fL    Platelets 213 140 - 450 10*3/mm3    Neutrophil % 69.5 42.7 - 76.0 %    Lymphocyte % 19.9 19.6 - 45.3 %    Monocyte % 8.4 5.0 - 12.0 %    Eosinophil % 1.5 0.3 - 6.2 %    Basophil % 0.3 0.0 - 1.5 %    Immature Grans % 0.4 0.0 - 0.5 %    Neutrophils, Absolute 6.60 1.70 - 7.00 10*3/mm3    Lymphocytes, Absolute 1.89 0.70 - 3.10 10*3/mm3    Monocytes, Absolute 0.80 0.10 - 0.90 10*3/mm3    Eosinophils, Absolute 0.14 0.00 - 0.40 10*3/mm3    Basophils, Absolute 0.03 0.00 - 0.20 10*3/mm3    Immature Grans, Absolute 0.04 0.00 - 0.05 10*3/mm3    nRBC 0.0 0.0 - 0.2 /100 WBC   Respiratory Panel PCR w/COVID-19(SARS-CoV-2) GABRIELLE/MARC/AISHA/PAD/COR/SANDY In-House, NP Swab in UTM/VTM, 2 HR TAT - Swab, Nasopharynx    Collection Time: 07/31/24  3:59 AM    Specimen: Nasopharynx; Swab   Result Value Ref Range    ADENOVIRUS, PCR Not Detected Not Detected    Coronavirus 229E Not Detected Not Detected    Coronavirus HKU1 Not Detected Not Detected    Coronavirus NL63 Not Detected Not Detected    Coronavirus OC43 Not Detected Not Detected    COVID19 Not Detected Not Detected - Ref. Range    Human Metapneumovirus Not Detected Not Detected    Human Rhinovirus/Enterovirus Not Detected Not Detected    Influenza A PCR Not Detected Not Detected    Influenza B PCR Not Detected Not Detected    Parainfluenza Virus 1 Not Detected Not Detected    Parainfluenza Virus 2 Not Detected Not Detected    Parainfluenza Virus 3 Not Detected Not Detected    Parainfluenza Virus 4 Not Detected Not Detected    RSV, PCR Not Detected Not Detected    Bordetella pertussis pcr Not Detected Not Detected     Bordetella parapertussis PCR Not Detected Not Detected    Chlamydophila pneumoniae PCR Not Detected Not Detected    Mycoplasma pneumo by PCR Not Detected Not Detected         RADIOLOGY  No Radiology Exams Resulted Within Past 24 Hours      MEDICATIONS GIVEN IN ER  Medications   sodium chloride 0.9 % flush 10 mL (has no administration in time range)         ORDERS PLACED DURING THIS VISIT:  Orders Placed This Encounter   Procedures    Respiratory Panel PCR w/COVID-19(SARS-CoV-2) GABRIELLE/MARC/AISHA/PAD/COR/SANDY In-House, NP Swab in UTM/VTM, 2 HR TAT - Swab, Nasopharynx    Comprehensive Metabolic Panel    Protime-INR    aPTT    BNP    High Sensitivity Troponin T    Magnesium    Phosphorus    CBC Auto Differential    ECG 12 Lead Other; weakness    Insert Peripheral IV    CBC & Differential         OUTPATIENT MEDICATION MANAGEMENT:  Current Facility-Administered Medications Ordered in Epic   Medication Dose Route Frequency Provider Last Rate Last Admin    sodium chloride 0.9 % flush 10 mL  10 mL Intravenous PRN Arleth Saavedra MD         Current Outpatient Medications Ordered in Epic   Medication Sig Dispense Refill    carvedilol CR (COREG CR) 10 MG 24 hr capsule Take 12.5 mg by mouth Daily.      hydrALAZINE (APRESOLINE) 50 MG tablet Take 1 tablet by mouth 3 (Three) Times a Day. 90 tablet 1    Baclofen (LIORESAL) 5 MG tablet Take 1 tablet by mouth Daily.      calcium acetate (PHOS BINDER,) 667 MG capsule capsule Take 3 capsules by mouth 3 (Three) Times a Day.      sennosides-docusate (Senna Plus) 8.6-50 MG per tablet Take 2 tablets by mouth Daily.           PROCEDURES  Procedures            PROGRESS, DATA ANALYSIS, CONSULTS, AND MEDICAL DECISION MAKING  All labs have been independently interpreted by me.  All radiology studies have been reviewed by me. All EKG's have been independently viewed and interpreted by me.  Discussion below represents my analysis of pertinent findings related to patient's condition, differential  diagnosis, treatment plan and final disposition.    Differential diagnosis includes but is not limited to   The differential diagnosis for generalized weakness or even near syncope/lightheadedness is quite broad and includes but is not limited to: hypoglycemia, orthostasis, vasovagal syncope, seizure, cardiac syncope, PE, electrolyte disturbances, sepsis, malignancy, profound anemia, aortic dissection, severe aortic stenosis, stroke, sah, gi bleeding, intoxication and medication effects.        Clinical Scores:                  ED Course as of 07/31/24 0538   Wed Jul 31, 2024   0345 EKG ER MD interpretation   Time: 3: 40  Rhythm and rate: Normal sinus rhythm at a rate of 61  Axis: Leftward  P waves: Normal  QRS complexes: Normal except for LVH  ST segments: no elevation nor depressions  T waves: no flattening or inversions  Comparison EKG is from July 26, 2024 [AR]   0445 Patient in renal failure with no acidemia and no hyperkalemia.  Patient's troponin is 45 and generally on prior lab evaluation his troponin has been in the 40s. [AR]   0458 I discussed all results with patient and his son.  Next appointment with primary provider is August 6.  We discussed home blood pressure monitoring but patient does not have a cuff.  Patient's blood pressure currently not at a level that we would intervene on particularly before dialysis.  Patient also denies any headache at this time.  Plan for dialysis later this morning and follow-up with primary provider. [AR]      ED Course User Index  [AR] Arleth Saavedra MD             AS OF 05:38 EDT VITALS:    BP - 165/74  HR - 65  TEMP - 97.6 °F (36.4 °C) (Tympanic)  O2 SATS - 97%      COMPLEXITY OF CARE  Admission was considered but after careful review of the patient's presentation, physical examination, diagnostic results, and response to treatment the patient may be safely discharged with outpatient follow-up.    DIAGNOSIS  Final diagnoses:   Generalized weakness    Hypertension, unspecified type   ESRD on hemodialysis         DISPOSITION  ED Disposition       ED Disposition   Discharge    Condition   Stable    Comment   --                Please note that portions of this document were completed with a voice recognition program.    Note Disclaimer: At [facility], we believe that sharing information builds trust and better relationships. You are receiving this note because you recently visited [facility]. It is possible you will see health information before a provider has talked with you about it. This kind of information can be easy to misunderstand. To help you fully understand what it means for your health, we urge you to discuss this note with your provider.         Arleth Saavedra MD  07/31/24 1868

## 2024-08-01 LAB
QT INTERVAL: 478 MS
QTC INTERVAL: 482 MS